# Patient Record
Sex: FEMALE | Race: WHITE | HISPANIC OR LATINO | Employment: OTHER | URBAN - METROPOLITAN AREA
[De-identification: names, ages, dates, MRNs, and addresses within clinical notes are randomized per-mention and may not be internally consistent; named-entity substitution may affect disease eponyms.]

---

## 2017-10-07 ENCOUNTER — HOSPITAL ENCOUNTER (OUTPATIENT)
Dept: LAB | Facility: MEDICAL CENTER | Age: 37
End: 2017-10-07
Attending: PSYCHIATRY & NEUROLOGY
Payer: COMMERCIAL

## 2017-10-07 LAB
B-HCG SERPL-ACNC: <0.6 MIU/ML (ref 0–5)
HCG SERPL QL: NEGATIVE

## 2017-10-07 PROCEDURE — 36415 COLL VENOUS BLD VENIPUNCTURE: CPT

## 2017-10-07 PROCEDURE — 84703 CHORIONIC GONADOTROPIN ASSAY: CPT

## 2017-10-07 PROCEDURE — 84702 CHORIONIC GONADOTROPIN TEST: CPT

## 2018-01-19 ENCOUNTER — OFFICE VISIT (OUTPATIENT)
Dept: URGENT CARE | Facility: CLINIC | Age: 38
End: 2018-01-19
Payer: COMMERCIAL

## 2018-01-19 VITALS
HEIGHT: 68 IN | SYSTOLIC BLOOD PRESSURE: 110 MMHG | WEIGHT: 137.4 LBS | TEMPERATURE: 98.1 F | HEART RATE: 75 BPM | DIASTOLIC BLOOD PRESSURE: 70 MMHG | OXYGEN SATURATION: 97 % | BODY MASS INDEX: 20.82 KG/M2

## 2018-01-19 DIAGNOSIS — K59.00 CONSTIPATION, UNSPECIFIED CONSTIPATION TYPE: ICD-10-CM

## 2018-01-19 PROCEDURE — 99214 OFFICE O/P EST MOD 30 MIN: CPT | Performed by: NURSE PRACTITIONER

## 2018-01-19 RX ORDER — LAMOTRIGINE 25 MG/1
TABLET, CHEWABLE ORAL
COMMUNITY
End: 2022-06-23

## 2018-01-19 RX ORDER — RISPERIDONE 0.25 MG/1
0.25 TABLET ORAL 2 TIMES DAILY
COMMUNITY

## 2018-01-19 RX ORDER — BUPROPION HYDROCHLORIDE 75 MG/1
75 TABLET ORAL 2 TIMES DAILY
COMMUNITY
End: 2022-06-23

## 2018-01-21 ASSESSMENT — ENCOUNTER SYMPTOMS
ABDOMINAL PAIN: 1
BLOOD IN STOOL: 1
NAUSEA: 0
DIARRHEA: 0
CONSTIPATION: 1
FEVER: 0
VOMITING: 0

## 2018-01-21 NOTE — PROGRESS NOTES
"Subjective:      Anny Tian is a 37 y.o. female who presents with Abdominal Pain (\"sharp pain on L side of ovary\") and Constipation (\" for about a week,nothing has helped,blood when trying\")            Abdominal Pain   This is a new problem. Episode onset: Pt reports onset of constipation about 4-7 days ago. She has not had a bowel movement for almost a week. Complains of lower left abd pain, intermittently. Denies fever or vomiting. + decrease in appetite. The onset quality is gradual. The problem occurs constantly. The problem has been unchanged. The pain is located in the LLQ. The pain is mild. The quality of the pain is a sensation of fullness. The abdominal pain does not radiate. Associated symptoms include constipation. Pertinent negatives include no diarrhea, fever, nausea or vomiting. She has tried nothing for the symptoms.   Constipation   Associated symptoms include abdominal pain. Pertinent negatives include no diarrhea, fever, nausea or vomiting.       Review of Systems   Constitutional: Negative for fever.   Gastrointestinal: Positive for abdominal pain, blood in stool (marilin) and constipation. Negative for diarrhea, nausea and vomiting.   All other systems reviewed and are negative.    Past Medical History:   Diagnosis Date   • Allergy    • Arrhythmia     birth   • Hay fever 9/25/2013   • Headache(784.0)    • Substance abuse     alcoholic; last drank about a year ago.       Past Surgical History:   Procedure Laterality Date   • DILATION AND CURETTAGE      3 TABS 1998,1999,2000   • TONSILLECTOMY      Childhood      Social History     Social History   • Marital status: Single     Spouse name: N/A   • Number of children: N/A   • Years of education: N/A     Occupational History   • Not on file.     Social History Main Topics   • Smoking status: Never Smoker   • Smokeless tobacco: Never Used   • Alcohol use No   • Drug use: No   • Sexual activity: Yes     Partners: Male      Comment: none      Other " "Topics Concern   • Not on file     Social History Narrative   • No narrative on file          Objective:     /70   Pulse 75   Temp 36.7 °C (98.1 °F)   Ht 1.727 m (5' 8\")   Wt 62.3 kg (137 lb 6.4 oz)   LMP 01/20/2013   SpO2 97%   BMI 20.89 kg/m²      Physical Exam   Constitutional: She is oriented to person, place, and time. Vital signs are normal. She appears well-developed and well-nourished.   HENT:   Head: Normocephalic and atraumatic.   Eyes: EOM are normal. Pupils are equal, round, and reactive to light.   Neck: Normal range of motion.   Cardiovascular: Normal rate and regular rhythm.    Pulmonary/Chest: Effort normal.   Abdominal: Soft. Normal appearance and bowel sounds are normal. She exhibits no distension. There is tenderness in the left lower quadrant. There is no rigidity, no rebound and no tenderness at McBurney's point.   Musculoskeletal: Normal range of motion.   Neurological: She is alert and oriented to person, place, and time.   Skin: Skin is warm and dry. Capillary refill takes less than 2 seconds.   Psychiatric: She has a normal mood and affect. Her speech is normal and behavior is normal. Thought content normal.   Vitals reviewed.              Assessment/Plan:     1. Constipation, unspecified constipation type    DDX discussed with patient include but are not limited to gastritis, constipation, diverticulitis, ovarian cyst  Her s/s seem consistent with constipation  I would like her to obtain OTC miralax and take one capful BID for one week  Increase water intake  Increase fiber intake  Strict ER precautions for inability to produce BM, vomiting or severe abdominal pain  Supportive care, differential diagnoses, and indications for immediate follow-up discussed with patient.    Pathogenesis of diagnosis discussed including typical length and natural progression.      Instructed to return to  or nearest emergency department if symptoms fail to improve, for any change in condition, " further concerns, or new concerning symptoms.  Patient states understanding of the plan of care and discharge instructions.

## 2018-02-28 ENCOUNTER — HOSPITAL ENCOUNTER (OUTPATIENT)
Dept: RADIOLOGY | Facility: MEDICAL CENTER | Age: 38
End: 2018-02-28
Attending: FAMILY MEDICINE
Payer: COMMERCIAL

## 2018-02-28 DIAGNOSIS — D17.1 LIPOMA OF BACK: ICD-10-CM

## 2018-02-28 PROCEDURE — 76604 US EXAM CHEST: CPT

## 2018-09-20 ENCOUNTER — OFFICE VISIT (OUTPATIENT)
Dept: URGENT CARE | Facility: CLINIC | Age: 38
End: 2018-09-20
Payer: COMMERCIAL

## 2018-09-20 VITALS
SYSTOLIC BLOOD PRESSURE: 110 MMHG | TEMPERATURE: 98 F | OXYGEN SATURATION: 98 % | HEIGHT: 68 IN | WEIGHT: 136 LBS | BODY MASS INDEX: 20.61 KG/M2 | HEART RATE: 78 BPM | RESPIRATION RATE: 16 BRPM | DIASTOLIC BLOOD PRESSURE: 70 MMHG

## 2018-09-20 DIAGNOSIS — L73.9 FOLLICULITIS: ICD-10-CM

## 2018-09-20 PROCEDURE — 99214 OFFICE O/P EST MOD 30 MIN: CPT | Performed by: FAMILY MEDICINE

## 2018-09-20 RX ORDER — SULFAMETHOXAZOLE AND TRIMETHOPRIM 800; 160 MG/1; MG/1
1 TABLET ORAL 2 TIMES DAILY
Qty: 20 TAB | Refills: 0 | Status: SHIPPED | OUTPATIENT
Start: 2018-09-20 | End: 2018-09-30

## 2018-09-23 NOTE — PROGRESS NOTES
"Subjective:   Anny Tian is a 38 y.o. female who presents for Rash (on both ankles, inner buttocks, x 2 months.)        Rash   This is a new problem. The current episode started more than 1 month ago. The problem has been gradually worsening since onset. The affected locations include the left buttock and right buttock. The rash is characterized by redness, swelling and pain.     Review of Systems   Skin: Positive for rash.     Allergies   Allergen Reactions   • Benadryl Allergy    • Vicodin [Hydrocodone-Acetaminophen]       Objective:   /70   Pulse 78   Temp 36.7 °C (98 °F)   Resp 16   Ht 1.727 m (5' 8\")   Wt 61.7 kg (136 lb)   SpO2 98%   BMI 20.68 kg/m²   Physical Exam   Constitutional: She is oriented to person, place, and time. She appears well-developed and well-nourished. No distress.   HENT:   Head: Normocephalic and atraumatic.   Eyes: Pupils are equal, round, and reactive to light. Conjunctivae and EOM are normal.   Cardiovascular: Normal rate and regular rhythm.    No murmur heard.  Pulmonary/Chest: Effort normal and breath sounds normal. No respiratory distress.   Abdominal: Soft. She exhibits no distension. There is no tenderness.   Neurological: She is alert and oriented to person, place, and time. She has normal reflexes. No sensory deficit.   Skin: Skin is warm and dry. Rash noted. Rash is papular.   Psychiatric: She has a normal mood and affect.         Assessment/Plan:   Assessment    1. Folliculitis  - sulfamethoxazole-trimethoprim (BACTRIM DS) 800-160 MG tablet; Take 1 Tab by mouth 2 times a day for 10 days.  Dispense: 20 Tab; Refill: 0    Differential diagnosis, natural history, supportive care, and indications for immediate follow-up discussed.       "

## 2019-08-05 ENCOUNTER — HOSPITAL ENCOUNTER (OUTPATIENT)
Dept: LAB | Facility: MEDICAL CENTER | Age: 39
End: 2019-08-05
Attending: STUDENT IN AN ORGANIZED HEALTH CARE EDUCATION/TRAINING PROGRAM
Payer: COMMERCIAL

## 2019-08-05 LAB
ALBUMIN SERPL BCP-MCNC: 4.7 G/DL (ref 3.2–4.9)
ALBUMIN/GLOB SERPL: 1.9 G/DL
ALP SERPL-CCNC: 47 U/L (ref 30–99)
ALT SERPL-CCNC: 15 U/L (ref 2–50)
ANION GAP SERPL CALC-SCNC: 7 MMOL/L (ref 0–11.9)
AST SERPL-CCNC: 18 U/L (ref 12–45)
BASOPHILS # BLD AUTO: 0.2 % (ref 0–1.8)
BASOPHILS # BLD: 0.02 K/UL (ref 0–0.12)
BILIRUB SERPL-MCNC: 0.6 MG/DL (ref 0.1–1.5)
BUN SERPL-MCNC: 9 MG/DL (ref 8–22)
CALCIUM SERPL-MCNC: 9.7 MG/DL (ref 8.5–10.5)
CHLORIDE SERPL-SCNC: 106 MMOL/L (ref 96–112)
CO2 SERPL-SCNC: 26 MMOL/L (ref 20–33)
CREAT SERPL-MCNC: 0.71 MG/DL (ref 0.5–1.4)
EOSINOPHIL # BLD AUTO: 0.05 K/UL (ref 0–0.51)
EOSINOPHIL NFR BLD: 0.6 % (ref 0–6.9)
ERYTHROCYTE [DISTWIDTH] IN BLOOD BY AUTOMATED COUNT: 43.9 FL (ref 35.9–50)
GLOBULIN SER CALC-MCNC: 2.5 G/DL (ref 1.9–3.5)
GLUCOSE SERPL-MCNC: 70 MG/DL (ref 65–99)
HCT VFR BLD AUTO: 43.7 % (ref 37–47)
HGB BLD-MCNC: 14.1 G/DL (ref 12–16)
HIV 1+2 AB+HIV1 P24 AG SERPL QL IA: NON REACTIVE
IMM GRANULOCYTES # BLD AUTO: 0.01 K/UL (ref 0–0.11)
IMM GRANULOCYTES NFR BLD AUTO: 0.1 % (ref 0–0.9)
LYMPHOCYTES # BLD AUTO: 1.98 K/UL (ref 1–4.8)
LYMPHOCYTES NFR BLD: 24.7 % (ref 22–41)
MCH RBC QN AUTO: 30.1 PG (ref 27–33)
MCHC RBC AUTO-ENTMCNC: 32.3 G/DL (ref 33.6–35)
MCV RBC AUTO: 93.4 FL (ref 81.4–97.8)
MONOCYTES # BLD AUTO: 0.41 K/UL (ref 0–0.85)
MONOCYTES NFR BLD AUTO: 5.1 % (ref 0–13.4)
NEUTROPHILS # BLD AUTO: 5.54 K/UL (ref 2–7.15)
NEUTROPHILS NFR BLD: 69.3 % (ref 44–72)
NRBC # BLD AUTO: 0 K/UL
NRBC BLD-RTO: 0 /100 WBC
PLATELET # BLD AUTO: 263 K/UL (ref 164–446)
PMV BLD AUTO: 10 FL (ref 9–12.9)
POTASSIUM SERPL-SCNC: 3.9 MMOL/L (ref 3.6–5.5)
PROT SERPL-MCNC: 7.2 G/DL (ref 6–8.2)
RBC # BLD AUTO: 4.68 M/UL (ref 4.2–5.4)
SODIUM SERPL-SCNC: 139 MMOL/L (ref 135–145)
TREPONEMA PALLIDUM IGG+IGM AB [PRESENCE] IN SERUM OR PLASMA BY IMMUNOASSAY: NON REACTIVE
WBC # BLD AUTO: 8 K/UL (ref 4.8–10.8)

## 2019-08-05 PROCEDURE — 87389 HIV-1 AG W/HIV-1&-2 AB AG IA: CPT

## 2019-08-05 PROCEDURE — 86780 TREPONEMA PALLIDUM: CPT

## 2019-08-05 PROCEDURE — 80053 COMPREHEN METABOLIC PANEL: CPT

## 2019-08-05 PROCEDURE — 36415 COLL VENOUS BLD VENIPUNCTURE: CPT

## 2019-08-05 PROCEDURE — 85025 COMPLETE CBC W/AUTO DIFF WBC: CPT

## 2019-08-21 ENCOUNTER — HOSPITAL ENCOUNTER (OUTPATIENT)
Dept: LAB | Facility: MEDICAL CENTER | Age: 39
End: 2019-08-21
Attending: OBSTETRICS & GYNECOLOGY
Payer: COMMERCIAL

## 2019-08-21 LAB — B-HCG SERPL-ACNC: <0.6 MIU/ML (ref 0–5)

## 2019-08-21 PROCEDURE — 84702 CHORIONIC GONADOTROPIN TEST: CPT

## 2019-08-21 PROCEDURE — 36415 COLL VENOUS BLD VENIPUNCTURE: CPT

## 2020-06-19 ENCOUNTER — HOSPITAL ENCOUNTER (OUTPATIENT)
Dept: HOSPITAL 8 - LAB | Age: 40
Discharge: HOME | End: 2020-06-19
Attending: FAMILY MEDICINE
Payer: COMMERCIAL

## 2020-06-19 DIAGNOSIS — R53.83: ICD-10-CM

## 2020-06-19 DIAGNOSIS — N95.1: Primary | ICD-10-CM

## 2020-06-19 LAB
ALBUMIN SERPL-MCNC: 4.2 G/DL (ref 3.4–5)
ALP SERPL-CCNC: 65 U/L (ref 45–117)
ALT SERPL-CCNC: 21 U/L (ref 12–78)
ANION GAP SERPL CALC-SCNC: 4 MMOL/L (ref 5–15)
BASOPHILS # BLD AUTO: 0.02 X10^3/UL (ref 0–0.1)
BASOPHILS NFR BLD AUTO: 0 % (ref 0–1)
BILIRUB SERPL-MCNC: 0.4 MG/DL (ref 0.2–1)
CALCIUM SERPL-MCNC: 8.7 MG/DL (ref 8.5–10.1)
CHLORIDE SERPL-SCNC: 110 MMOL/L (ref 98–107)
CREAT SERPL-MCNC: 0.87 MG/DL (ref 0.55–1.02)
EOSINOPHIL # BLD AUTO: 0.09 X10^3/UL (ref 0–0.4)
EOSINOPHIL NFR BLD AUTO: 1 % (ref 1–7)
ERYTHROCYTE [DISTWIDTH] IN BLOOD BY AUTOMATED COUNT: 12.6 % (ref 9.6–15.2)
LYMPHOCYTES # BLD AUTO: 1.89 X10^3/UL (ref 1–3.4)
LYMPHOCYTES NFR BLD AUTO: 27 % (ref 22–44)
MCH RBC QN AUTO: 31.6 PG (ref 27–34.8)
MCHC RBC AUTO-ENTMCNC: 33.4 G/DL (ref 32.4–35.8)
MCV RBC AUTO: 94.8 FL (ref 80–100)
MD: NO
MONOCYTES # BLD AUTO: 0.37 X10^3/UL (ref 0.2–0.8)
MONOCYTES NFR BLD AUTO: 5 % (ref 2–9)
NEUTROPHILS # BLD AUTO: 4.58 X10^3/UL (ref 1.8–6.8)
NEUTROPHILS NFR BLD AUTO: 66 % (ref 42–75)
PLATELET # BLD AUTO: 278 X10^3/UL (ref 130–400)
PMV BLD AUTO: 7.9 FL (ref 7.4–10.4)
PROT SERPL-MCNC: 7.5 G/DL (ref 6.4–8.2)
RBC # BLD AUTO: 4.7 X10^6/UL (ref 3.82–5.3)

## 2020-06-19 PROCEDURE — 84403 ASSAY OF TOTAL TESTOSTERONE: CPT

## 2020-06-19 PROCEDURE — 82627 DEHYDROEPIANDROSTERONE: CPT

## 2020-06-19 PROCEDURE — 80053 COMPREHEN METABOLIC PANEL: CPT

## 2020-06-19 PROCEDURE — 85025 COMPLETE CBC W/AUTO DIFF WBC: CPT

## 2020-06-19 PROCEDURE — 84443 ASSAY THYROID STIM HORMONE: CPT

## 2020-06-19 PROCEDURE — 83002 ASSAY OF GONADOTROPIN (LH): CPT

## 2020-06-19 PROCEDURE — 36415 COLL VENOUS BLD VENIPUNCTURE: CPT

## 2020-06-19 PROCEDURE — 82670 ASSAY OF TOTAL ESTRADIOL: CPT

## 2021-11-23 ENCOUNTER — TELEPHONE (OUTPATIENT)
Dept: OBGYN | Facility: CLINIC | Age: 41
End: 2021-11-23

## 2021-11-23 NOTE — TELEPHONE ENCOUNTER
Patient called requesting the date of her last Pap smear with Dr. Posey. Message was given to her MA.

## 2021-11-29 NOTE — TELEPHONE ENCOUNTER
Patient called earlier wanting to know when her last pap was. I called her back to let her know when her last pap was. Asked if she wanted to schedule an appt but declined.

## 2021-12-21 ENCOUNTER — HOSPITAL ENCOUNTER (OUTPATIENT)
Dept: LAB | Facility: MEDICAL CENTER | Age: 41
End: 2021-12-21
Attending: OBSTETRICS & GYNECOLOGY
Payer: COMMERCIAL

## 2021-12-21 ENCOUNTER — HOSPITAL ENCOUNTER (OUTPATIENT)
Dept: LAB | Facility: MEDICAL CENTER | Age: 41
End: 2021-12-21
Attending: NURSE PRACTITIONER
Payer: COMMERCIAL

## 2021-12-21 ENCOUNTER — HOSPITAL ENCOUNTER (OUTPATIENT)
Facility: MEDICAL CENTER | Age: 41
End: 2021-12-21
Attending: OBSTETRICS & GYNECOLOGY
Payer: COMMERCIAL

## 2021-12-21 ENCOUNTER — GYNECOLOGY VISIT (OUTPATIENT)
Dept: OBGYN | Facility: CLINIC | Age: 41
End: 2021-12-21
Payer: COMMERCIAL

## 2021-12-21 VITALS
SYSTOLIC BLOOD PRESSURE: 105 MMHG | HEIGHT: 68 IN | BODY MASS INDEX: 18.64 KG/M2 | WEIGHT: 123 LBS | DIASTOLIC BLOOD PRESSURE: 60 MMHG

## 2021-12-21 DIAGNOSIS — Z11.3 SCREENING EXAMINATION FOR SEXUALLY TRANSMITTED DISEASE: ICD-10-CM

## 2021-12-21 DIAGNOSIS — Z12.4 SCREENING FOR CERVICAL CANCER: ICD-10-CM

## 2021-12-21 DIAGNOSIS — Z01.419 WELL WOMAN EXAM WITH ROUTINE GYNECOLOGICAL EXAM: ICD-10-CM

## 2021-12-21 PROBLEM — R07.89 ATYPICAL CHEST PAIN: Status: ACTIVE | Noted: 2019-08-08

## 2021-12-21 PROBLEM — K29.70 GASTRITIS DUE TO HELICOBACTER SPECIES: Status: ACTIVE | Noted: 2018-08-20

## 2021-12-21 PROBLEM — F41.8 MIXED ANXIETY DEPRESSIVE DISORDER: Status: ACTIVE | Noted: 2019-08-08

## 2021-12-21 PROBLEM — B36.0 TINEA VERSICOLOR: Status: ACTIVE | Noted: 2017-06-27

## 2021-12-21 PROBLEM — N95.1 HOT FLASHES DUE TO MENOPAUSE: Status: ACTIVE | Noted: 2020-05-12

## 2021-12-21 PROBLEM — B96.81 GASTRITIS DUE TO HELICOBACTER SPECIES: Status: ACTIVE | Noted: 2018-08-20

## 2021-12-21 LAB
HAV IGM SERPL QL IA: NORMAL
HBV CORE IGM SER QL: NORMAL
HBV SURFACE AG SER QL: NORMAL
HCV AB SER QL: NORMAL
HIV 1+2 AB+HIV1 P24 AG SERPL QL IA: NORMAL
TREPONEMA PALLIDUM IGG+IGM AB [PRESENCE] IN SERUM OR PLASMA BY IMMUNOASSAY: NORMAL

## 2021-12-21 PROCEDURE — 84403 ASSAY OF TOTAL TESTOSTERONE: CPT

## 2021-12-21 PROCEDURE — G0101 CA SCREEN;PELVIC/BREAST EXAM: HCPCS | Performed by: OBSTETRICS & GYNECOLOGY

## 2021-12-21 PROCEDURE — 84402 ASSAY OF FREE TESTOSTERONE: CPT

## 2021-12-21 PROCEDURE — 80074 ACUTE HEPATITIS PANEL: CPT

## 2021-12-21 PROCEDURE — 86780 TREPONEMA PALLIDUM: CPT

## 2021-12-21 PROCEDURE — 87389 HIV-1 AG W/HIV-1&-2 AB AG IA: CPT

## 2021-12-21 PROCEDURE — 84270 ASSAY OF SEX HORMONE GLOBUL: CPT

## 2021-12-21 PROCEDURE — 87624 HPV HI-RISK TYP POOLED RSLT: CPT

## 2021-12-21 PROCEDURE — 87591 N.GONORRHOEAE DNA AMP PROB: CPT

## 2021-12-21 PROCEDURE — 36415 COLL VENOUS BLD VENIPUNCTURE: CPT

## 2021-12-21 PROCEDURE — 87491 CHLMYD TRACH DNA AMP PROBE: CPT

## 2021-12-21 PROCEDURE — 88175 CYTOPATH C/V AUTO FLUID REDO: CPT

## 2021-12-21 NOTE — PROGRESS NOTES
Well Woman Exam    CC: well woman exam        HPI: Anny Tian is a 41 y.o.  female who presents for her Annual Gynecologic Exam. She is a pre-existing patient from both my other practices.   Pt has no complaints.  Currently using vasectomy for contraception, sexually active with male partner. She states he had a manic episode last year and was hospitalized and diagnosed with bipolar. She desires STI screening because she is unclear what he did. She does report that she is on testosterone cream and 2 weeks monthly proesesterone cream. She has had multiple vaginal rejuvination treatments with Dr carreon who is also Rx'ed her meds and bioidenticals.       Health Maintenance:  Pap: 3 years, remote h/o abnormal in   Gardisil: not completed  Dexa: not applicable    ROS:  Gen: denies fevers, general concerns  CV/resp: reports no concerns  Breasts: no masses/changes  GI: denies abd pain, GI concerns  : denies irregular vaginal bleeding, discharge, pain, denies urinary complaints  Neuro: denies hx of migraines w/ aura  Endo: denies significant weight changes, irregular menses, temperature intolerance, hotflashes/nightsweats  Psych: denies concerns about mood    OB History    Para Term  AB Living   6 1 1   4 1   SAB IAB Ectopic Molar Multiple Live Births   1 3       1      # Outcome Date GA Lbr Larry/2nd Weight Sex Delivery Anes PTL Lv   6             5 SAB 2011              Birth Comments: IUD   4 Term 06 40w0d  3.657 kg (8 lb 1 oz) F Vag-Spont None  BETTY   3 IAB            2 IAB            1 IAB                  GYN Hx:   Denies hx of STIs  Denies hx of abnl paps see above     Past Medical History:   Diagnosis Date   • Allergy    • Arrhythmia     birth   • Gastroesophageal reflux disease 2014   • Hay fever 2013   • Headache(784.0)    • Mixed anxiety depressive disorder 2019   • Substance abuse ([facility]CC)     alcoholic; last drank about a year ago.         Past Surgical History:   Procedure Laterality Date   • DILATION AND CURETTAGE      3 TABS 1998,1999,2000   • TONSILLECTOMY      Childhood       Medications:   Current Outpatient Medications Ordered in Epic   Medication Sig Dispense Refill   • buPROPion (WELLBUTRIN) 75 MG Tab Take 75 mg by mouth 2 times a day.     • risperidone (RISPERDAL) 0.25 MG Tab Take 0.25 mg by mouth 2 times a day.     • LamoTRIgine (LAMICTAL) 25 MG Chew Tab Take  by mouth.     • guaifenesin-codeine (CHERATUSSIN AC) SOLN Take 5-10 mL by mouth every 6 hours as needed for Cough. 90 mL 0   • oxycodone-acetaminophen (PERCOCET) 5-325 MG TABS Take 1-2 Tabs by mouth every four hours as needed. 15 Tab 0   • ibuprofen (MOTRIN) 800 MG TABS Take 1 Tab by mouth every 8 hours as needed for Mild Pain. 30 Tab 3   • Prenatal MV-Min-Fe Fum-FA-DHA (PRENATAL 1 PO) Take  by mouth.       No current Epic-ordered facility-administered medications on file.       Allergies: Benadryl allergy and Vicodin [hydrocodone-acetaminophen]    Social History     Socioeconomic History   • Marital status: Single     Spouse name: Not on file   • Number of children: Not on file   • Years of education: Not on file   • Highest education level: Not on file   Occupational History   • Not on file   Tobacco Use   • Smoking status: Never Smoker   • Smokeless tobacco: Never Used   Substance and Sexual Activity   • Alcohol use: No   • Drug use: No   • Sexual activity: Yes     Partners: Male     Comment: none    Other Topics Concern   • Not on file   Social History Narrative   • Not on file     Social Determinants of Health     Financial Resource Strain:    • Difficulty of Paying Living Expenses: Not on file   Food Insecurity:    • Worried About Running Out of Food in the Last Year: Not on file   • Ran Out of Food in the Last Year: Not on file   Transportation Needs:    • Lack of Transportation (Medical): Not on file   • Lack of Transportation (Non-Medical): Not on file   Physical  "Activity:    • Days of Exercise per Week: Not on file   • Minutes of Exercise per Session: Not on file   Stress:    • Feeling of Stress : Not on file   Social Connections:    • Frequency of Communication with Friends and Family: Not on file   • Frequency of Social Gatherings with Friends and Family: Not on file   • Attends Alevism Services: Not on file   • Active Member of Clubs or Organizations: Not on file   • Attends Club or Organization Meetings: Not on file   • Marital Status: Not on file   Intimate Partner Violence:    • Fear of Current or Ex-Partner: Not on file   • Emotionally Abused: Not on file   • Physically Abused: Not on file   • Sexually Abused: Not on file   Housing Stability:    • Unable to Pay for Housing in the Last Year: Not on file   • Number of Places Lived in the Last Year: Not on file   • Unstable Housing in the Last Year: Not on file     Exercises regularly- yes    Family History   Problem Relation Age of Onset   • Osteoporosis Mother    • Other Mother    • Alcohol/Drug Brother    • Depression Brother    • Stroke Paternal Grandmother    • Stroke Paternal Grandfather      Denies hx of GI/GYN/breast cancers      Physical Exam   /60 (BP Location: Right arm, Patient Position: Sitting)   Ht 1.727 m (5' 8\")   Wt 55.8 kg (123 lb)   LMP 2021 (Exact Date)   BMI 18.70 kg/m²   gen: AAO, NAD, affect appropriate  Neck: non-tender, no masses, no thyromegaly/nodules appreciated  CV: RRR; no LE edema  resp: ctab  Breast: symmetric, no skin changes, no masses, nontender, no nipple discharge, no lymphadenopathy  abd: soft, NT, ND, no masses, no organomegaly, no hernias  : NEFG, normal urethral meatus, normal anus/perineum, normal vagina and cervix.  Uterus small, anteverted, no adnexal masses/tenderness  Skin: warm/dry, no lesions    Breast and pelvic exam chaperoned by EZEQUIEL Esquivel).  Assessment:   41 y.o.  here for well woman exam  1. Screening examination for sexually transmitted " disease  THINPREP PAP W/HPV AND CTNG    HIV AG/AB COMBO ASSAY SCREENING    HEPATITIS PANEL ACUTE(4 COMPONENTS)    T.PALLIDUM AB EIA    CANCELED: T.PALLIDUM AB EIA   2. Well woman exam with routine gynecological exam     3. Screening for cervical cancer         Plan:   Pap:collected, reviewed pap guidelines  Encouraged exercise and proper diet.  Mammograms starting @ age 40 annually.  GC/CT done with pap and lab slip given for STI serum screening.     Follow up in 1 year for WWE or PRN

## 2021-12-23 LAB
C TRACH DNA GENITAL QL NAA+PROBE: NEGATIVE
CYTOLOGY REG CYTOL: NORMAL
HPV HR 12 DNA CVX QL NAA+PROBE: NEGATIVE
HPV16 DNA SPEC QL NAA+PROBE: NEGATIVE
HPV18 DNA SPEC QL NAA+PROBE: NEGATIVE
N GONORRHOEA DNA GENITAL QL NAA+PROBE: NEGATIVE
SPECIMEN SOURCE: NORMAL
SPECIMEN SOURCE: NORMAL

## 2021-12-27 LAB
SHBG SERPL-SCNC: 121 NMOL/L (ref 30–135)
TESTOST FREE SERPL-MCNC: 1.1 PG/ML (ref 1.1–5.8)
TESTOST SERPL-MCNC: 16 NG/DL (ref 9–55)

## 2022-06-23 ENCOUNTER — OFFICE VISIT (OUTPATIENT)
Dept: INTERNAL MEDICINE | Facility: OTHER | Age: 42
End: 2022-06-23
Payer: COMMERCIAL

## 2022-06-23 VITALS
HEIGHT: 68 IN | DIASTOLIC BLOOD PRESSURE: 68 MMHG | BODY MASS INDEX: 22.07 KG/M2 | TEMPERATURE: 98.1 F | WEIGHT: 145.6 LBS | OXYGEN SATURATION: 94 % | SYSTOLIC BLOOD PRESSURE: 112 MMHG | HEART RATE: 85 BPM

## 2022-06-23 DIAGNOSIS — L82.1 SEBORRHEIC KERATOSIS: ICD-10-CM

## 2022-06-23 DIAGNOSIS — F41.8 MIXED ANXIETY DEPRESSIVE DISORDER: ICD-10-CM

## 2022-06-23 DIAGNOSIS — R68.82 LOW LIBIDO: ICD-10-CM

## 2022-06-23 PROCEDURE — 99214 OFFICE O/P EST MOD 30 MIN: CPT | Mod: GC | Performed by: STUDENT IN AN ORGANIZED HEALTH CARE EDUCATION/TRAINING PROGRAM

## 2022-06-23 RX ORDER — CHLORAL HYDRATE 500 MG
1000 CAPSULE ORAL
COMMUNITY

## 2022-06-23 RX ORDER — CALCIUM CARBONATE 300MG(750)
400 TABLET,CHEWABLE ORAL DAILY
COMMUNITY
Start: 2019-06-01

## 2022-06-23 RX ORDER — MULTIVIT-MIN/IRON/FOLIC ACID/K 18-600-40
4000 CAPSULE ORAL
COMMUNITY

## 2022-06-23 RX ORDER — PHENOL 1.4 %
AEROSOL, SPRAY (ML) MUCOUS MEMBRANE
COMMUNITY

## 2022-06-23 RX ORDER — M-VIT,TX,IRON,MINS/CALC/FOLIC 27MG-0.4MG
1 TABLET ORAL DAILY
COMMUNITY

## 2022-06-23 RX ORDER — BUPROPION HYDROCHLORIDE 150 MG/1
150 TABLET, EXTENDED RELEASE ORAL DAILY
COMMUNITY
Start: 2019-10-11

## 2022-06-23 ASSESSMENT — PATIENT HEALTH QUESTIONNAIRE - PHQ9: CLINICAL INTERPRETATION OF PHQ2 SCORE: 0

## 2022-06-23 NOTE — PATIENT INSTRUCTIONS
Please get labs- Fasting   Please call OBGYN to schedule appt  F/u in 6-8 weeks     Skin Tag care after liquid nitrogen freeze.   May blister within a few hours (clear, red or purple)  Bleeding may occur (though this is not common)  The blister shrinks to be replaced by a scab within a few days  Swelling should settle in a few days  Healing depends on the site the scab peels off within a week after cryotherapy to facial actinic keratoses, after about three weeks to a similar lesion on the hand, and may cause ulceration on the lower leg and take three months or longer to heal  A white gino (hypopigmentation) or scar may result  If there are any signs of infection the patient should contact the practice-For example, increasing redness of surrounding skin, discharge, increasing pain

## 2022-06-23 NOTE — PROGRESS NOTES
"New Patient to Establish    Reason to establish: New patient to establish    CC: \" I have a low libido for the past 2 years\"    HPI: Patient is a 42-year-old female presented to clinic to establish care and complaining about decreased libido ongoing from past 1 year.  Patient has a current medical history of mood disorders for which she has been taking Wellbutrin and risperidone prescribed by behavioral health.  She reported her symptoms are well controlled as she is on the same dose from a several years.  Patient denied sleep disturbance, hot flashes, palpitations.  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  Further patient reported her  has a cognitive impairments and said\" I think he may have a some kind of Alzheimer because he has had difficulty remembering things.\"  When inquired about her marriage life patient says her marriage is\" fine\" although she wishes her  had more time for each other further patient reported her sex life has been frustrating, and states both of the partner not in the mood for intimacy.  Patient denied any active or prevogen cream which she bought from online.  When asked about why she is using it patient states \" I read on the Internet and I think it was good\"   She also questioning aboious reproductive disease.  Last menstrual period was 6/10-6/13, regular and adequate blood flow, periods Come every 24 to 28 days.   Patient using a topical estrut there are some kind of injections that you injected before the intercourse which helped with with the desire of intimacy, states that she read about these injections over the Internet.  Patient denied urinary symptoms, vaginal discharge, abnormal uterine bleeding or any other sexual/ symptoms reported.  Patient following OB/GYN, last follow-up visit was in December 2021.      Patient Active Problem List    Diagnosis Date Noted   • Hot flashes due to menopause 05/12/2020   • Atypical chest pain 08/08/2019 "   • Mixed anxiety depressive disorder 08/08/2019   • Body mass index (BMI) 21.0-21.9, adult 07/19/2019   • Gastritis due to Helicobacter species 08/20/2018   • Tinea versicolor 06/27/2017   • Gastroesophageal reflux disease 12/29/2014   • Other benign neoplasm of skin, unspecified 10/06/2014   • Palpitations 10/06/2014   • Well adult health check 10/06/2014   • Tachycardia 09/25/2013   • Hay fever 09/25/2013   • Hx of drug abuse ([facility]) 09/25/2013   • Dysuria 09/25/2013   • Lower abdominal pain 09/25/2013   • History of alcoholism ([facility]) 04/19/2013       Past Medical History:   Diagnosis Date   • Allergy    • Arrhythmia     birth   • Gastroesophageal reflux disease 12/29/2014   • Hay fever 9/25/2013   • Headache(784.0)    • Mixed anxiety depressive disorder 8/8/2019   • Substance abuse ([facility])     alcoholic; last drank about a year ago.        Current Outpatient Medications   Medication Sig Dispense Refill   • buPROPion SR (WELLBUTRIN-SR) 150 MG TABLET SR 12 HR sustained-release tablet Take 150 Tablets by mouth every day.     • Magnesium 400 MG Tab Take 400 mg by mouth every day.     • choline-magnesium salicylate (TRILISATE) 500 MG/5ML Liquid Take 500 mg by mouth 3 times a day as needed.     • Omega-3 Fatty Acids (FISH OIL) 1000 MG Cap capsule Take 1,000 mg by mouth 3 times a day with meals.     • therapeutic multivitamin-minerals (THERAGRAN-M) Tab Take 1 Tablet by mouth every day.     • Cholecalciferol (VITAMIN D) 50 MCG (2000 UT) Cap Take  by mouth.     • Melatonin 10 MG Tab Take  by mouth.     • buPROPion (WELLBUTRIN) 75 MG Tab Take 75 mg by mouth 2 times a day.     • risperidone (RISPERDAL) 0.25 MG Tab Take 0.25 mg by mouth 2 times a day.     • LamoTRIgine (LAMICTAL) 25 MG Chew Tab Take  by mouth.     • guaifenesin-codeine (CHERATUSSIN AC) SOLN Take 5-10 mL by mouth every 6 hours as needed for Cough. 90 mL 0   • oxycodone-acetaminophen (PERCOCET) 5-325 MG TABS Take 1-2 Tabs by mouth every four hours as needed. 15  Tab 0   • ibuprofen (MOTRIN) 800 MG TABS Take 1 Tab by mouth every 8 hours as needed for Mild Pain. 30 Tab 3   • Prenatal MV-Min-Fe Fum-FA-DHA (PRENATAL 1 PO) Take  by mouth.       No current facility-administered medications for this visit.       Allergies as of 06/23/2022 - Reviewed 06/23/2022   Allergen Reaction Noted   • Benadryl allergy  04/19/2013   • Vicodin [hydrocodone-acetaminophen]  04/19/2013       Social History     Socioeconomic History   • Marital status: Single     Spouse name: Not on file   • Number of children: Not on file   • Years of education: Not on file   • Highest education level: Not on file   Occupational History   • Not on file   Tobacco Use   • Smoking status: Never Smoker   • Smokeless tobacco: Never Used   Vaping Use   • Vaping Use: Never used   Substance and Sexual Activity   • Alcohol use: No   • Drug use: No   • Sexual activity: Yes     Partners: Male     Comment: none    Other Topics Concern   • Not on file   Social History Narrative   • Not on file     Social Determinants of Health     Financial Resource Strain: Not on file   Food Insecurity: Not on file   Transportation Needs: Not on file   Physical Activity: Not on file   Stress: Not on file   Social Connections: Not on file   Intimate Partner Violence: Not on file   Housing Stability: Not on file       Family History   Problem Relation Age of Onset   • Osteoporosis Mother    • Other Mother    • Alcohol/Drug Brother    • Depression Brother    • Stroke Paternal Grandmother    • Stroke Paternal Grandfather        Past Surgical History:   Procedure Laterality Date   • DILATION AND CURETTAGE      3 TABS 1998,1999,2000   • TONSILLECTOMY      Childhood       ROS: As per HPI. Additional pertinent symptoms as noted below.    REVIEW OF SYSTEMS:  CONSTITUTIONAL:  The patient denies any fever or chills.  HEENT:  No headaches, sore throat.  Positive for left otalgia.  CARDIOVASCULAR:  No history of palpitation or arrhythmia.  RESPIRATORY:   "History is negative for cough, productive sputum.  GASTROINTESTINAL:  History is negative for nausea or vomiting.  All other systems essentially negative.      /68 (BP Location: Left arm, Patient Position: Sitting, BP Cuff Size: Large adult)   Pulse 85   Temp 36.7 °C (98.1 °F) (Temporal)   Ht 1.727 m (5' 8\")   Wt 66 kg (145 lb 9.6 oz)   SpO2 94%   BMI 22.14 kg/m²     Physical Exam  General:  Alert and oriented, No apparent distress.    Eyes: Pupils equal and reactive. No scleral icterus.    Throat: Clear no erythema or exudates noted.    Neck: Supple. No lymphadenopathy noted. Thyroid not enlarged.    Lungs: Clear to auscultation and percussion bilaterally.    Cardiovascular: Regular rate and rhythm. No murmurs, rubs or gallops.    Abdomen:  Benign. No rebound or guarding noted.    Extremities: No clubbing, cyanosis, edema.    Skin: Clear. No rash or suspicious skin lesions noted.  Skin tags /actin keratosis noted over the arms and neck.           Assessment and Plan    Patient is s a 42-year-old female presented to clinic to establish care and complaining about decreased libido from past 1 year.   Possible differential diagnosis includes    #Sexual dysfunction in woman  #Hypoactive sexual desire  #Medication side effects  #Marital dysfunction  #Psychiatric illness    We discussed in detail the possible underlying etiology of patient's condition could be from her current medication and psychiatric condition.  Patient already has some of the work-up was done by her OB/GYN and checked sex hormone binding globulin, testosterone and total testosterone which all came back within normal limit also she was tested for STD screenings nonreactive.  At this time I highly advised the patient to schedule an follow-up visit with her OB/GYN to discuss the further work-up if needed.  Also recommend discussing with behavioral therapist sometimes CBT helpful to target current thoughts. Sexual dysfunction is a " multifactorial psychosocial condition and sometimes only medication might not be helpful with this condition, we may need both medication and behavioral therapy to overcome with this condition.  Patient agreed with the plan and will schedule on a follow-up visit with the OB/GYN.    #Actin keratosis   Skin-colored, pink, or erythematous macule with gritty scale (no papule).  Upon patient request treated the 1 lesion with iquid nitrogen  Consent:  Risks and benefits of therapy discussed with patient who voices understanding and agrees with planned care. No barriers to communication or understanding identified.  After obtaining informed consent, the patient's identity, procedure, and site were verified.  Used liquid nitrogen, 5 treatments of 10 seconds each applied.  Frost ring obtained.  Education-aftercare including blister formation, risk of bleeding and risk of recurrence were discussed.  All questions answered.  Return for treatment as needed.  Patient tolerated the procedure without any complication.      Signed by: Robin Gastelum M.D.

## 2022-07-14 ENCOUNTER — HOSPITAL ENCOUNTER (EMERGENCY)
Facility: MEDICAL CENTER | Age: 42
End: 2022-07-15
Attending: EMERGENCY MEDICINE
Payer: COMMERCIAL

## 2022-07-14 VITALS
DIASTOLIC BLOOD PRESSURE: 74 MMHG | RESPIRATION RATE: 16 BRPM | BODY MASS INDEX: 22.19 KG/M2 | TEMPERATURE: 96.2 F | HEART RATE: 78 BPM | WEIGHT: 146.39 LBS | OXYGEN SATURATION: 95 % | SYSTOLIC BLOOD PRESSURE: 112 MMHG | HEIGHT: 68 IN

## 2022-07-14 DIAGNOSIS — K59.00 CONSTIPATION, UNSPECIFIED CONSTIPATION TYPE: ICD-10-CM

## 2022-07-15 PROCEDURE — 99284 EMERGENCY DEPT VISIT MOD MDM: CPT

## 2022-07-15 NOTE — ED NOTES
Ambulated to room with steady gait, reports no BM since Sunday or Monday c/o pressure.   Pt took miralax and mag citrate with no relief

## 2022-07-15 NOTE — ED NOTES
"Pt ambulated past nurses station and yelled \"it worked, thanks!\" to another RN and they notified me. Pt appears to have left ER.  MD notified  "

## 2022-07-15 NOTE — ED NOTES
"Pt refusing blood draw at this time, states \"I just want to get the stool out and go home.\" Pt back in lobby.  "

## 2022-07-15 NOTE — ED NOTES
".  Chief Complaint   Patient presents with   • Constipation     Unable to pass stool. Used magnesium citrate and oxide, and miralax.  Last BM Sunday or Monday.    • Abdominal Pain     Cramps/Bloating/Pressure       41 yo female ambulatory to triage for above complaint. Abdominal pain protocol ordered. Patient reports a previous event of fecal impaction many years ago. Denies obstructions.      Pt is alert and oriented, speaking in full sentences, follows commands and responds appropriately to questions. NAD in triage, although she appears uncomfortable.      Patient placed back for blood draw and educated on triage process. Asked to inform RN of any changes.    /74   Pulse 78   Temp (!) 35.7 °C (96.2 °F) (Temporal)   Resp 16   Ht 1.727 m (5' 8\")   Wt 66.4 kg (146 lb 6.2 oz)   LMP 07/07/2022 (Exact Date)   SpO2 95%   BMI 22.26 kg/m²     "

## 2022-07-15 NOTE — ED PROVIDER NOTES
ED Provider Note    CHIEF COMPLAINT  Chief Complaint   Patient presents with   • Constipation     Unable to pass stool. Used magnesium citrate and oxide, and miralax.  Last BM Sunday or Monday.    • Abdominal Pain     Cramps/Bloating/Pressure        HPI    Primary care provider: Robin Gastelum M.D.   History obtained from: Patient  History limited by: None     Anny Tian is a 42 y.o. female who presents to the ED with  complaining of constipation.  Patient reports she has not had a bowel movement for 5 to 6 days.  She reports history of irregular bowel movements.  She states that sometimes she with go for a few days without bowel movements and other times she would have 2 bowel movements a day.  She takes mag citrate and MiraLAX daily to prevent constipation.  She is reporting a lot of lower abdominal and rectal pressure.  She denies fever/chest pain/shortness of breath or difficulty breathing/nausea/vomiting/dysuria/rash.  She denies prior abdominal surgeries.  She denies possibility of pregnancy.    REVIEW OF SYSTEMS  Please see HPI for pertinent positives/negatives.  All other systems reviewed and are negative.     PAST MEDICAL HISTORY  Past Medical History:   Diagnosis Date   • Mixed anxiety depressive disorder 8/8/2019   • Gastroesophageal reflux disease 12/29/2014   • Hay fever 9/25/2013   • Allergy    • Arrhythmia     birth   • Headache(784.0)    • Substance abuse (HCC)     alcoholic; last drank about a year ago.         SURGICAL HISTORY  Past Surgical History:   Procedure Laterality Date   • DILATION AND CURETTAGE      3 TABS 1998,1999,2000   • TONSILLECTOMY      Childhood        SOCIAL HISTORY  Social History     Tobacco Use   • Smoking status: Never Smoker   • Smokeless tobacco: Never Used   Vaping Use   • Vaping Use: Never used   Substance and Sexual Activity   • Alcohol use: Yes     Alcohol/week: 4.8 oz     Types: 8 Glasses of wine per week     Comment: 8-14 drinks a week   • Drug use: No  "  • Sexual activity: Yes     Partners: Male     Comment: none         FAMILY HISTORY  Family History   Problem Relation Age of Onset   • Osteoporosis Mother    • Other Mother    • Alcohol/Drug Brother    • Depression Brother    • Stroke Paternal Grandmother    • Stroke Paternal Grandfather         CURRENT MEDICATIONS  Home Medications     Reviewed by Shannan Nguyen R.N. (Registered Nurse) on 07/14/22 at 2152  Med List Status: Not Addressed   Medication Last Dose Status   buPROPion SR (WELLBUTRIN-SR) 150 MG TABLET SR 12 HR sustained-release tablet  Active   Cholecalciferol (VITAMIN D) 50 MCG (2000 UT) Cap  Active   choline-magnesium salicylate (TRILISATE) 500 MG/5ML Liquid  Active   Magnesium 400 MG Tab  Active   Melatonin 10 MG Tab  Active   Omega-3 Fatty Acids (FISH OIL) 1000 MG Cap capsule  Active   risperidone (RISPERDAL) 0.25 MG Tab  Active   therapeutic multivitamin-minerals (THERAGRAN-M) Tab  Active                 ALLERGIES  Allergies   Allergen Reactions   • Benadryl Allergy    • Vicodin [Hydrocodone-Acetaminophen]         PHYSICAL EXAM  VITAL SIGNS: /74   Pulse 78   Temp (!) 35.7 °C (96.2 °F) (Temporal)   Resp 16   Ht 1.727 m (5' 8\")   Wt 66.4 kg (146 lb 6.2 oz)   LMP 07/07/2022 (Exact Date)   SpO2 95%   BMI 22.26 kg/m²  @ML[[id number]::@     Pulse ox interpretation: 95% I interpret this pulse ox as normal     Constitutional: Well developed, well nourished, alert in no apparent distress, nontoxic appearance    HENT: No external signs of trauma, normocephalic, mask on due to COVID-19 pandemic  Eyes: PERRL, conjunctiva without erythema, no discharge, no icterus    Neck: Soft and supple, trachea midline, no stridor, no tenderness, no LAD, no JVD, good ROM    Cardiovascular: Regular rate and rhythm, no murmurs/rubs/gallops, strong distal pulses and good perfusion    Thorax & Lungs: No respiratory distress, CTAB   Abdomen: Soft, mild tenderness across lower abdomen, nondistended, no guarding, no " "rebound, normal BS    Rectal: Chaperone present for exam. Normal external exam without hemorrhoids. Good rectal tone.  Stool felt at tip of finger.  Unable to remove digitally.  Back: No CVAT    Extremities: No cyanosis, no edema, no gross deformity, good ROM, no tenderness, intact distal pulses with brisk cap refill    Skin: Warm, dry, no pallor/cyanosis, no rash noted    Lymphatic: No lymphadenopathy noted    Neuro: A/O times 3, no focal deficits noted, ambulating without difficulty  Psychiatric: Cooperative, slightly anxious      DIAGNOSTIC STUDIES / PROCEDURES        LABS  All labs reviewed by me.     Results for orders placed or performed during the hospital encounter of 12/21/21   TESTOSTERONE F&T FEMALES/CHILD   Result Value Ref Range    Testosterone,Total 16 9 - 55 ng/dL    Sex Hormone Bind Globulin 121 30 - 135 nmol/L    Testosterone Fr LCMS 1.1 1.1 - 5.8 pg/mL        RADIOLOGY  The radiologist's interpretation of all radiological studies have been reviewed by me.     No orders to display          COURSE & MEDICAL DECISION MAKING  Nursing notes, VS, PMSFHx reviewed in chart.     Review of past medical records shows the patient was last seen in the office on June 23, 2022 for anxiety and depression and low libido.      Differential diagnoses considered include but are not limited to: Appendicitis, bowel perforation/obstruction, ileus, diverticulitis, KS/renal colic, colitis, constipation, muscle strain, hernia, pregnancy/ectopic, PID, endometriosis, ovarian cyst/torsion      History and physical exam as above.  Patient requesting treatment for constipation and did not want any testing performed.  She was initially treated with fleets enema without improvement.  I attempted digital disimpaction but unsuccessful.  Soapsuds enema was then given.  I was later informed by ED nurse that patient left the ED after reporting that \"it worked.\"      The patient is referred to a primary physician for blood pressure " management, diabetic screening, and for all other preventative health concerns.       FINAL IMPRESSION  1. Constipation, unspecified constipation type Acute          DISPOSITION  Patient eloped from the ED      FOLLOW UP  No follow-up provider specified.       OUTPATIENT MEDICATIONS  Discharge Medication List as of 7/15/2022  1:54 AM             Electronically signed by: Sixto Blankenship D.O., 7/14/2022 11:22 PM      Portions of this record were made with voice recognition software.  Despite my review, spelling/grammar/context errors may still remain.  Interpretation of this chart should be taken in this context.

## 2022-08-22 ENCOUNTER — APPOINTMENT (OUTPATIENT)
Dept: INTERNAL MEDICINE | Facility: OTHER | Age: 42
End: 2022-08-22
Payer: COMMERCIAL

## 2022-10-12 ENCOUNTER — RESEARCH ENCOUNTER (OUTPATIENT)
Dept: RESEARCH | Facility: WORKSITE | Age: 42
End: 2022-10-12
Payer: COMMERCIAL

## 2022-10-12 NOTE — RESEARCH NOTE
Enrollment audit revealed that while pt enrolled on 7/27/2021, a glitch in Epic caused the signed consent form to not be saved. Contacted pt by phone (voicemail) and [employer] informing them and requesting they sign our current consent form to rectify the matter.

## 2022-10-19 ENCOUNTER — RESEARCH ENCOUNTER (OUTPATIENT)
Dept: RESEARCH | Facility: WORKSITE | Age: 42
End: 2022-10-19
Payer: COMMERCIAL

## 2022-10-20 DIAGNOSIS — Z00.6 RESEARCH STUDY PATIENT: ICD-10-CM

## 2022-11-04 ENCOUNTER — HOSPITAL ENCOUNTER (OUTPATIENT)
Facility: MEDICAL CENTER | Age: 42
End: 2022-11-04
Attending: PATHOLOGY
Payer: COMMERCIAL

## 2022-11-04 DIAGNOSIS — Z00.6 RESEARCH STUDY PATIENT: ICD-10-CM

## 2022-11-09 LAB
ELF SCORE: 8.53
RELATIVE RISK: NORMAL
RISK GROUP: NORMAL
RISK: 3.3 %

## 2023-03-30 LAB — HBA1C MFR BLD: 5 % (ref ?–5.8)

## 2023-07-11 ENCOUNTER — OFFICE VISIT (OUTPATIENT)
Dept: INTERNAL MEDICINE | Facility: OTHER | Age: 43
End: 2023-07-11
Payer: COMMERCIAL

## 2023-07-11 VITALS
HEART RATE: 88 BPM | SYSTOLIC BLOOD PRESSURE: 108 MMHG | OXYGEN SATURATION: 96 % | DIASTOLIC BLOOD PRESSURE: 80 MMHG | HEIGHT: 68 IN | WEIGHT: 163.2 LBS | TEMPERATURE: 97.8 F | BODY MASS INDEX: 24.74 KG/M2

## 2023-07-11 DIAGNOSIS — Z78.9 ALCOHOL USE: ICD-10-CM

## 2023-07-11 DIAGNOSIS — R22.1 LUMP IN NECK: ICD-10-CM

## 2023-07-11 PROCEDURE — 3079F DIAST BP 80-89 MM HG: CPT

## 2023-07-11 PROCEDURE — 99214 OFFICE O/P EST MOD 30 MIN: CPT | Mod: GC

## 2023-07-11 PROCEDURE — 3074F SYST BP LT 130 MM HG: CPT

## 2023-07-11 RX ORDER — NALTREXONE HYDROCHLORIDE 50 MG/1
25 TABLET, FILM COATED ORAL DAILY
Qty: 25 TABLET | Refills: 0 | Status: SHIPPED | OUTPATIENT
Start: 2023-07-11

## 2023-07-11 RX ORDER — RISPERIDONE 0.25 MG/1
TABLET, ORALLY DISINTEGRATING ORAL DAILY
COMMUNITY

## 2023-07-11 RX ORDER — GINSENG 100 MG
50 CAPSULE ORAL
COMMUNITY
Start: 2023-05-28

## 2023-07-11 RX ORDER — LANOLIN ALCOHOL/MO/W.PET/CERES
100 CREAM (GRAM) TOPICAL DAILY
Qty: 30 TABLET | Refills: 3 | Status: SHIPPED | OUTPATIENT
Start: 2023-07-11 | End: 2023-12-11

## 2023-07-11 RX ORDER — THEANINE 100 MG
CAPSULE ORAL
COMMUNITY

## 2023-07-11 RX ORDER — LANOLIN ALCOHOL/MO/W.PET/CERES
50 CREAM (GRAM) TOPICAL DAILY
COMMUNITY

## 2023-07-11 ASSESSMENT — ENCOUNTER SYMPTOMS
COUGH: 0
PHOTOPHOBIA: 0
SORE THROAT: 0
DIARRHEA: 0
CLAUDICATION: 0
LOSS OF CONSCIOUSNESS: 0
SEIZURES: 0
VOMITING: 0
TREMORS: 0
HEADACHES: 1
EYE PAIN: 0
HEMOPTYSIS: 0
DIZZINESS: 0
WEIGHT LOSS: 0
DIAPHORESIS: 0
MYALGIAS: 0
ORTHOPNEA: 0
ABDOMINAL PAIN: 0
CONSTIPATION: 0
NAUSEA: 0
EYE DISCHARGE: 0
NECK PAIN: 0
DOUBLE VISION: 0
BACK PAIN: 0
NERVOUS/ANXIOUS: 1
PND: 1
FOCAL WEAKNESS: 0
CONSTITUTIONAL NEGATIVE: 1
SHORTNESS OF BREATH: 0
SENSORY CHANGE: 0
PALPITATIONS: 0
SPEECH CHANGE: 0
HEARTBURN: 0
TINGLING: 0
BLURRED VISION: 1
FEVER: 0
CHILLS: 0

## 2023-07-11 ASSESSMENT — LIFESTYLE VARIABLES: SUBSTANCE_ABUSE: 0

## 2023-07-11 ASSESSMENT — PATIENT HEALTH QUESTIONNAIRE - PHQ9: CLINICAL INTERPRETATION OF PHQ2 SCORE: 0

## 2023-07-11 NOTE — PROGRESS NOTES
"    New Patient  CC: Establish Care with Primary Care Physician     Chief Complaint   Patient presents with    Bump     Noticed bump (neck hump type) on back of neck last week. Right below where her lipoma was cut out       HPI:    Anny Tian is a 43 y.o. female who presents today with concern for \"lump\" on the patient's cervical spine.  Patient notes that she has had the lump for at least 1 week, but is unsure if chronicity.  First noticed by her partner who took pictures at the time.  Per patient, appearance unchanged.  Patient denies any pain with rotation, tenderness, dizziness, fevers, chills, nausea, drenching night sweats, or other lumps on her body.     Alcohol use history also reviewed with patient.  Patient states that she is currently drinking 2-4 beers or glasses of wine per evening.  Patient states that she mostly feels cravings for the taste of good wine or beer, and feels that it would be easy for her to cut back to approximately 1 drink per evening.  Patient states that she is careful not to drink in order to feel a buzz in order to avoid binge drinking.    At today's visit, the patient also shared that she has had multiple chronic issues, including 1 to 2 years of heavy menses, intermittent headaches, episodic sharp chest pain occurring approximately once every 3 months and is only instantaneous, worsening nearsightedness, strong family history of osteoporosis, involving \"dark spots\" that are followed by dermatologist.    ROS:     Review of Systems   Constitutional: Negative.  Negative for chills, diaphoresis, fever, malaise/fatigue and weight loss.   HENT:  Negative for hearing loss, sore throat and tinnitus.    Eyes:  Positive for blurred vision. Negative for double vision, photophobia, pain and discharge.   Respiratory:  Negative for cough, hemoptysis and shortness of breath.    Cardiovascular:  Positive for chest pain and PND. Negative for palpitations, orthopnea, claudication and leg " swelling.   Gastrointestinal:  Negative for abdominal pain, constipation, diarrhea, heartburn, nausea and vomiting.   Genitourinary:  Positive for dysuria. Negative for frequency and urgency.   Musculoskeletal:  Negative for back pain, myalgias and neck pain.   Skin: Negative.  Negative for itching and rash.   Neurological:  Positive for headaches. Negative for dizziness, tingling, tremors, sensory change, speech change, focal weakness, seizures and loss of consciousness.   Psychiatric/Behavioral:  Negative for substance abuse. The patient is nervous/anxious.        Past Medical History:   Diagnosis Date    Allergy     Arrhythmia     birth    Gastroesophageal reflux disease 12/29/2014    Hay fever 9/25/2013    Headache(784.0)     Mixed anxiety depressive disorder 8/8/2019    Substance abuse ([facility])     alcoholic; last drank about a year ago.        Patient Active Problem List    Diagnosis Date Noted    Hot flashes due to menopause 05/12/2020    Atypical chest pain 08/08/2019    Mixed anxiety depressive disorder 08/08/2019    Body mass index (BMI) of 21.0 to 21.9 in adult 07/19/2019    Gastritis due to Helicobacter species 08/20/2018    Tinea versicolor 06/27/2017    Gastroesophageal reflux disease 12/29/2014    Other benign neoplasm of skin, unspecified 10/06/2014    Palpitations 10/06/2014    Well adult health check 10/06/2014    Tachycardia 09/25/2013    Hay fever 09/25/2013    Hx of drug abuse ([facility]) 09/25/2013    Dysuria 09/25/2013    Lower abdominal pain 09/25/2013    History of alcoholism ([facility]) 04/19/2013       Past Surgical History:   Procedure Laterality Date    DILATION AND CURETTAGE      3 TABS 1998,1999,2000    TONSILLECTOMY      Childhood       Family History   Problem Relation Age of Onset    Osteoporosis Mother     Other Mother     Alcohol/Drug Brother     Depression Brother     Stroke Paternal Grandmother     Stroke Paternal Grandfather        Social History     Tobacco Use    Smoking status: Never     "Smokeless tobacco: Never   Vaping Use    Vaping Use: Never used   Substance Use Topics    Alcohol use: Yes     Alcohol/week: 4.8 oz     Types: 8 Glasses of wine per week     Comment: 8-14 drinks a week    Drug use: No       Current Outpatient Medications   Medication Sig Dispense Refill    Risperidone 0.25 MG TABLET DISPERSIBLE Take  by mouth every day.      L-Theanine 100 MG Cap Take  by mouth.      pyridoxine (VITAMIN B-6) 50 MG Tab Take 50 mg by mouth every day.      Calcium Citrate 150 MG Cap Take  by mouth.      ASHWAGANDHA PO Take  by mouth.      magnesium citrate Solution Take 300 mL by mouth one time.      naltrexone (DEPADE) 50 MG Tab Take 0.5 Tablets by mouth every day. 25 Tablet 0    buPROPion SR (WELLBUTRIN-SR) 150 MG TABLET SR 12 HR sustained-release tablet Take 150 Tablets by mouth every day.      Magnesium 400 MG Tab Take 400 mg by mouth every day.      Omega-3 Fatty Acids (FISH OIL) 1000 MG Cap capsule Take 1,000 mg by mouth 3 times a day with meals.      therapeutic multivitamin-minerals (THERAGRAN-M) Tab Take 1 Tablet by mouth every day.      Cholecalciferol (VITAMIN D) 50 MCG (2000 UT) Cap Take 4,000 Units by mouth.      Melatonin 10 MG Tab Take  by mouth.      risperidone (RISPERDAL) 0.25 MG Tab Take 0.25 mg by mouth 2 times a day.       No current facility-administered medications for this visit.       Allergies:    Benadryl allergy and Vicodin [hydrocodone-acetaminophen]    Physical Exam:    /80 (BP Location: Left arm, Patient Position: Sitting, BP Cuff Size: Adult)   Pulse 88   Temp 36.6 °C (97.8 °F) (Temporal)   Ht 1.727 m (5' 8\")   Wt 74 kg (163 lb 3.2 oz)   SpO2 96%   BMI 24.81 kg/m²     General: Well-developed, well-nourished female in no distress  HEENT: NC/AT  Eyes: Conjuntiva without any obvious injection or erythema.   Mouth: mucous membranes moist, no erythema  Neck: Supple, non-tender with no thyromegaly or adenopathy.  Poorly defined, rubbery thickening of tissue over " caudal cervical spine  Extremites: No cyanosis/clubbing/edema. No obvious deformities.  Skin: Warm and dry.  No visible rashes.  Neuro: Alert & oriented, grossly non-focal  Psych: Patient is awake, alert and oriented with recent and remote memory intact. Affect normal, mood normal, judgment normal.    Assessment and Plan:     1. Lump in neck  Although texture/character consistent with lipoma, mass is poorly defined and lying superior to cervical spine. Unclear if this mass is involving nuchal/supraspinous ligament. Will need imaging to get adequate characterization, followed by referral to appropriate provider depending on the depth of involvement.  - US-SOFT TISSUES OF HEAD - NECK; Future  - depending on depth of involvement, referral to dermatology vs orthopedics/neurosurgery     2. Alcohol use  Patient drinking more than recommended amount. Patient reports drinking 2-4 drinks per night with some consistency for ~5 years. 5 year period of sobriety prior. Patient counseled on reduction on intake. Patient uninterested in IOP at this time. She will reduce intake under close supervision in the setting of current prescription of Wellbutrin lowering seizure threshold.  - Initiate Depede 25 mg daily, can consider increasing dose at next visit if well tolerated.  - Acute follow up in 1-2 weeks to monitor for symptoms of withdrawal and to ensure appropriate progress to goal of 1 standard drink per day or less.    All imaging results and lab results and consult notes are reviewed at this visit.    Signed by: Nathen Aaron M.D.

## 2023-07-11 NOTE — PROGRESS NOTES
Teaching Physician Attestation      Level of Participation    I have personally interviewed and examined the patient.  In addition, I discussed with the resident physician the patient's history, exam, assessment and plan in detail.  Topics listed in my addendum were the focus of the visit.  Healthcare maintenance was not addressed this visit unless listed as a topic in my addendum.  I agree with the plan as written along with the following additions/modifications:    Patient here to reestablish care with resident panel.    Many possible topics to discuss, triaged as below:    Past medical history decreased libido, actinic keratosis, mood disorder, H. pylori gastritis, alcohol use disorder per chart review.    Neck mass possibly consistent with lipoma, but needs further evaluation  -Patient's partner noticed a lump on the patient's back, patient denies B symptoms, reports previous lipoma in a similar area that was removed surgically.  On exam there is approximately a 3 cm rubbery mass over the lower cervical/upper thoracic spine, cannot completely palpate the edges, nontender, nonerythematous.  Recent CBC was normal.  No cervical or clavicular lymphadenopathy.    Plan  -Ultrasound to better characterize, consider referral to surgery for subsequent removal if needed.      Alcohol use disorder, recurrent  -3-4 drinks daily, previously reports binge drinking, is interested in reducing usage, patient is very pleasant.  Vital signs stable, is not actively withdrawing, states that she is able to not drink on certain days and does not withdraw, does not feel she has a craving to drink although states that she does like the taste of alcohol.  Began discussion of on possible dangers of continued drinking and dangers of withdrawal.  Recommend thiamine/multivitamin, also discussed options of naltrexone, Alcoholics Anonymous, intensive outpatient monitoring.  Plan per Dr. Aaron's note.  B12 and lfts nl 3/30/23    Addendum:  Patient reports wanting to do gradual cessation on her own without monitoring, recommended slow taper of current drinking to prevent withdrawal (no more than 1 drink less per day increments), addition of thiamine/multivitamin.  Patient will also begin naltrexone.  Cautioned to seek care if withdrawal sx. close follow-up in 1 week.    Chest pain  -sharp, brief, non-exertional, no sob, occurring once a month..  Unlikely cardiac, defer to future visit.  Well-appearing today.    Chronic Ha  -chronic (years), occasional, non-intractible.  Needs /fu future visit      Menstrual changes  -2 years chronic, nl hg 3/2023, needs f/u future vist      Loss of taste  -lost years ago completely for 1 month, then modified taste from baseline return.  Needs subsequent follow-up.    Supplement use -need to  on supplement use.    Return to clinic in 1 to 2 weeks for close follow-up.

## 2023-08-22 ENCOUNTER — OFFICE VISIT (OUTPATIENT)
Dept: INTERNAL MEDICINE | Facility: OTHER | Age: 43
End: 2023-08-22
Payer: COMMERCIAL

## 2023-08-22 VITALS
OXYGEN SATURATION: 96 % | TEMPERATURE: 98.4 F | SYSTOLIC BLOOD PRESSURE: 115 MMHG | BODY MASS INDEX: 23.4 KG/M2 | WEIGHT: 158 LBS | DIASTOLIC BLOOD PRESSURE: 79 MMHG | HEART RATE: 82 BPM | HEIGHT: 69 IN

## 2023-08-22 DIAGNOSIS — Z78.9 ALCOHOL USE: ICD-10-CM

## 2023-08-22 DIAGNOSIS — R22.1 LUMP ON NECK: ICD-10-CM

## 2023-08-22 DIAGNOSIS — Z00.00 WELL ADULT HEALTH CHECK: ICD-10-CM

## 2023-08-22 PROCEDURE — 99214 OFFICE O/P EST MOD 30 MIN: CPT | Mod: GC

## 2023-08-22 PROCEDURE — 3078F DIAST BP <80 MM HG: CPT

## 2023-08-22 PROCEDURE — 3074F SYST BP LT 130 MM HG: CPT

## 2023-08-22 RX ORDER — BUPROPION HYDROCHLORIDE 100 MG/1
100 TABLET, EXTENDED RELEASE ORAL EVERY MORNING
COMMUNITY
Start: 2023-08-09

## 2023-10-06 NOTE — PROGRESS NOTES
Established Patient    Patient Care Team:  Nathen Aaron M.D. as PCP - General (Internal Medicine)    CC:   Chief Complaint   Patient presents with    Results       HPI:  Anny Tian is a 43 y.o. female with past medical history significant for GERD, alcohol use, mixed anxiety/depressive disorder, lipoma s/p resection who presents today with the following Chief Complaint(s): Follow up for lump on neck and preventative care.    Ms. Tian continues to express anxiety concerning the lump over her cervical neck that was first highlighted by her  prior to our most recent appointment.  Clinic, the patient is able to follow-up with ultrasound no visible benign mass. Patient at this visit appears to be highlighting the prominence of her cervical spine that is immediately superior medial to previous surgical scar from lipoma excision.    Ms. Tian presents today curious about additional laboratory screening continue routine healthcare maintenance.    Patient further states she has quit alcohol cold turkey against advice received at previous visit to wean off alcohol.  Patient states she had 1 or 2 days where she noticed she was shaking but does not report any heightened anxiety, seizures, or lapses of consciousness.  Patient had previously had 1 to 2 glasses of wine per night reported.  Patient has not had any recent lab work concerning liver or kidney function.    ROS:       Review of systems negative except for as otherwise specified in the HPI.      Past Medical History:   Diagnosis Date    Allergy     Arrhythmia     birth    Gastroesophageal reflux disease 12/29/2014    Hay fever 9/25/2013    Headache(784.0)     Mixed anxiety depressive disorder 8/8/2019    Substance abuse (HCC)     alcoholic; last drank about a year ago.      Patient Active Problem List    Diagnosis Date Noted    Hot flashes due to menopause 05/12/2020    Atypical chest pain 08/08/2019    Mixed anxiety depressive disorder 08/08/2019     Body mass index (BMI) of 21.0 to 21.9 in adult 07/19/2019    Gastritis due to Helicobacter species 08/20/2018    Tinea versicolor 06/27/2017    Gastroesophageal reflux disease 12/29/2014    Other benign neoplasm of skin, unspecified 10/06/2014    Palpitations 10/06/2014    Well adult health check 10/06/2014    Tachycardia 09/25/2013    Hay fever 09/25/2013    Hx of drug abuse ([facility]) 09/25/2013    Dysuria 09/25/2013    Lower abdominal pain 09/25/2013    History of alcoholism ([facility]) 04/19/2013     Social History     Tobacco Use    Smoking status: Never    Smokeless tobacco: Never   Vaping Use    Vaping Use: Never used   Substance Use Topics    Alcohol use: Yes     Alcohol/week: 4.8 oz     Types: 8 Glasses of wine per week     Comment: 8-14 drinks a week    Drug use: No     Current Outpatient Medications   Medication Sig Dispense Refill    buPROPion SR (WELLBUTRIN-SR) 100 MG TABLET SR 12 HR Take 100 mg by mouth every morning.      Risperidone 0.25 MG TABLET DISPERSIBLE Take  by mouth every day.      L-Theanine 100 MG Cap Take  by mouth.      pyridoxine (VITAMIN B-6) 50 MG Tab Take 50 mg by mouth every day.      Calcium Citrate 150 MG Cap Take  by mouth.      ASHWAGANDHA PO Take  by mouth.      magnesium citrate Solution Take 300 mL by mouth one time.      Zinc 50 MG Tab Take 50 mg by mouth every 48 hours.      thiamine (THIAMINE) 100 MG tablet Take 1 Tablet by mouth every day. 30 Tablet 3    Magnesium 400 MG Tab Take 400 mg by mouth every day.      Omega-3 Fatty Acids (FISH OIL) 1000 MG Cap capsule Take 1,000 mg by mouth 3 times a day with meals.      therapeutic multivitamin-minerals (THERAGRAN-M) Tab Take 1 Tablet by mouth every day.      Cholecalciferol (VITAMIN D) 50 MCG (2000 UT) Cap Take 4,000 Units by mouth.      Melatonin 10 MG Tab Take  by mouth.      risperidone (RISPERDAL) 0.25 MG Tab Take 0.25 mg by mouth 2 times a day.      naltrexone (DEPADE) 50 MG Tab Take 0.5 Tablets by mouth every day. (Patient not  "taking: Reported on 8/22/2023) 25 Tablet 0    buPROPion SR (WELLBUTRIN-SR) 150 MG TABLET SR 12 HR sustained-release tablet Take 150 Tablets by mouth every day. (Patient not taking: Reported on 8/22/2023)       No current facility-administered medications for this visit.     Past Surgical History:   Procedure Laterality Date    DILATION AND CURETTAGE      3 TABS 1998,1999,2000    TONSILLECTOMY      Childhood     Family History   Problem Relation Age of Onset    Osteoporosis Mother     Other Mother     Alcohol/Drug Brother     Depression Brother     Stroke Paternal Grandmother     Stroke Paternal Grandfather      Social History     Tobacco Use    Smoking status: Never    Smokeless tobacco: Never   Vaping Use    Vaping Use: Never used   Substance Use Topics    Alcohol use: Yes     Alcohol/week: 4.8 oz     Types: 8 Glasses of wine per week     Comment: 8-14 drinks a week    Drug use: No       Allergies:  Hydrocodone-acetaminophen, Benadryl allergy, and Vicodin [hydrocodone-acetaminophen]    Physical Exam:  /79 (BP Location: Right arm, Patient Position: Sitting, BP Cuff Size: Small adult)   Pulse 82   Temp 36.9 °C (98.4 °F) (Temporal)   Ht 1.753 m (5' 9\")   Wt 71.7 kg (158 lb)   SpO2 96%   BMI 23.33 kg/m²     General: Well-developed, well-nourished female in no distress  HEENT: NC/AT  Eyes: Conjuntiva without any obvious injection or erythema.   Mouth: mucous membranes moist, no erythema  Neck: Supple, non-tender with no thyromegaly or adenopathy. Cervical pedicles noted to be more prominent In area of previous lipoma excision..   Lungs: Clear to auscultation bilaterally with good respiratory effort.  No wheezes, crackles or rhonci are heard.  Heart: Normal rate, regular rhythm with no murmurs, rubs or gallops heard.  Abdomen: Soft, non-tender, non-distended. No guarding or rigidity. No organomegaly noted.  Extremites: No cyanosis/clubbing/edema. No obvious deformities.  Skin: Warm and dry.  No visible " cristóbal.  Neuro: Alert & oriented, grossly non-focal, CN II-XII grossly intact  Psych: Patient is awake, alert and oriented with recent and remote memory intact. Affect anxious, mood normal, judgment normal.    Assessment and Plan:   1. Alcohol use  Patient advised to maintain abstinence from alcohol.  Patient is not interested in intervention such as behavioral therapy or attending Alcoholics Anonymous meetings at this time.  - Comp Metabolic Panel; Future    2. Well adult health check  Patient due for routine screening.  - Comp Metabolic Panel; Future  - HEMOGLOBIN A1C; Future  - Lipid Profile; Future    3. Lump on Neck  Thought to be enhanced prominence due to previous excision causing tightness of skin over the cervical pedicles.  Patient provided with assurance at this time.  No need for further imaging as ultrasound has already demonstrated no mass in the area where the patient is concerned beyond normal baron prominences.    All imaging results and lab results and consult notes are reviewed at this visit.    Signed by: Nathen Aaron M.D.  PGY II

## 2023-12-11 RX ORDER — LANOLIN ALCOHOL/MO/W.PET/CERES
100 CREAM (GRAM) TOPICAL DAILY
Qty: 30 TABLET | Refills: 3 | Status: SHIPPED | OUTPATIENT
Start: 2023-12-11

## 2024-02-07 ENCOUNTER — APPOINTMENT (OUTPATIENT)
Dept: INTERNAL MEDICINE | Facility: OTHER | Age: 44
End: 2024-02-07
Payer: COMMERCIAL

## 2024-04-15 RX ORDER — LANOLIN ALCOHOL/MO/W.PET/CERES
100 CREAM (GRAM) TOPICAL DAILY
Qty: 30 TABLET | Refills: 3 | Status: SHIPPED | OUTPATIENT
Start: 2024-04-15

## 2024-04-15 NOTE — TELEPHONE ENCOUNTER
Received request via: Pharmacy    Was the patient seen in the last year in this department? Yes    Does the patient have an active prescription (recently filled or refills available) for medication(s) requested? No    Pharmacy Name:  [id number]  [place], [place] - [address]     Does the patient have [facility] and need 100 day supply (blood pressure, diabetes and cholesterol meds only)? Patient does not have SCP

## 2024-06-19 ENCOUNTER — RESEARCH ENCOUNTER (OUTPATIENT)
Dept: RESEARCH | Facility: MEDICAL CENTER | Age: 44
End: 2024-06-19
Payer: COMMERCIAL

## 2024-07-16 ENCOUNTER — DOCUMENTATION (OUTPATIENT)
Dept: BEHAVIORAL HEALTH | Facility: CLINIC | Age: 44
End: 2024-07-16
Payer: COMMERCIAL

## 2024-07-16 ENCOUNTER — HOSPITAL ENCOUNTER (OUTPATIENT)
Dept: BEHAVIORAL HEALTH | Facility: MEDICAL CENTER | Age: 44
End: 2024-07-16
Attending: FAMILY MEDICINE
Payer: COMMERCIAL

## 2024-07-16 VITALS
OXYGEN SATURATION: 99 % | DIASTOLIC BLOOD PRESSURE: 70 MMHG | BODY MASS INDEX: 23.33 KG/M2 | HEIGHT: 69 IN | HEART RATE: 73 BPM | SYSTOLIC BLOOD PRESSURE: 122 MMHG

## 2024-07-16 DIAGNOSIS — R41.840 ATTENTION DEFICIT: ICD-10-CM

## 2024-07-16 DIAGNOSIS — Z79.899 HIGH RISK MEDICATION USE: ICD-10-CM

## 2024-07-16 DIAGNOSIS — F90.0 ATTENTION DEFICIT HYPERACTIVITY DISORDER, INATTENTIVE TYPE: ICD-10-CM

## 2024-07-16 PROCEDURE — 99204 OFFICE O/P NEW MOD 45 MIN: CPT | Performed by: FAMILY MEDICINE

## 2024-07-16 PROCEDURE — 99214 OFFICE O/P EST MOD 30 MIN: CPT | Performed by: FAMILY MEDICINE

## 2024-07-16 RX ORDER — DEXTROAMPHETAMINE SACCHARATE, AMPHETAMINE ASPARTATE MONOHYDRATE, DEXTROAMPHETAMINE SULFATE AND AMPHETAMINE SULFATE 2.5; 2.5; 2.5; 2.5 MG/1; MG/1; MG/1; MG/1
10 CAPSULE, EXTENDED RELEASE ORAL EVERY MORNING
Qty: 30 CAPSULE | Refills: 0 | Status: SHIPPED | OUTPATIENT
Start: 2024-07-16 | End: 2024-07-30

## 2024-07-16 ASSESSMENT — ENCOUNTER SYMPTOMS
GASTROINTESTINAL NEGATIVE: 1
BRUISES/BLEEDS EASILY: 0
MUSCULOSKELETAL NEGATIVE: 1
TINGLING: 0
CHILLS: 0
PSYCHIATRIC NEGATIVE: 1
RESPIRATORY NEGATIVE: 1
NAUSEA: 0
COUGH: 0
CONSTITUTIONAL NEGATIVE: 1
DOUBLE VISION: 0
MYALGIAS: 0
DEPRESSION: 0
FEVER: 0
CARDIOVASCULAR NEGATIVE: 1
NEUROLOGICAL NEGATIVE: 1
BLURRED VISION: 0
EYES NEGATIVE: 1
DIZZINESS: 0
HEARTBURN: 0
PALPITATIONS: 0
HEADACHES: 0
HEMOPTYSIS: 0

## 2024-07-16 ASSESSMENT — ANXIETY QUESTIONNAIRES
5. BEING SO RESTLESS THAT IT IS HARD TO SIT STILL: SEVERAL DAYS
7. FEELING AFRAID AS IF SOMETHING AWFUL MIGHT HAPPEN: NOT AT ALL
3. WORRYING TOO MUCH ABOUT DIFFERENT THINGS: NOT AT ALL
6. BECOMING EASILY ANNOYED OR IRRITABLE: SEVERAL DAYS
4. TROUBLE RELAXING: NOT AT ALL
2. NOT BEING ABLE TO STOP OR CONTROL WORRYING: NOT AT ALL
GAD7 TOTAL SCORE: 2
1. FEELING NERVOUS, ANXIOUS, OR ON EDGE: NOT AT ALL

## 2024-07-16 ASSESSMENT — PATIENT HEALTH QUESTIONNAIRE - PHQ9
SUM OF ALL RESPONSES TO PHQ QUESTIONS 1-9: 4
5. POOR APPETITE OR OVEREATING: 1 - SEVERAL DAYS
CLINICAL INTERPRETATION OF PHQ2 SCORE: 0

## 2024-07-26 ENCOUNTER — DOCUMENTATION (OUTPATIENT)
Dept: BEHAVIORAL HEALTH | Facility: CLINIC | Age: 44
End: 2024-07-26
Payer: COMMERCIAL

## 2024-07-30 ENCOUNTER — HOSPITAL ENCOUNTER (OUTPATIENT)
Dept: BEHAVIORAL HEALTH | Facility: MEDICAL CENTER | Age: 44
End: 2024-07-30
Attending: FAMILY MEDICINE
Payer: COMMERCIAL

## 2024-07-30 DIAGNOSIS — F90.0 ATTENTION DEFICIT HYPERACTIVITY DISORDER, INATTENTIVE TYPE: ICD-10-CM

## 2024-07-30 PROCEDURE — 99214 OFFICE O/P EST MOD 30 MIN: CPT | Performed by: FAMILY MEDICINE

## 2024-07-30 RX ORDER — DEXTROAMPHETAMINE SACCHARATE, AMPHETAMINE ASPARTATE, DEXTROAMPHETAMINE SULFATE AND AMPHETAMINE SULFATE 2.5; 2.5; 2.5; 2.5 MG/1; MG/1; MG/1; MG/1
10 TABLET ORAL EVERY MORNING
Qty: 30 TABLET | Refills: 0 | Status: SHIPPED | OUTPATIENT
Start: 2024-07-30 | End: 2024-08-29

## 2024-09-05 DIAGNOSIS — F90.0 ATTENTION DEFICIT HYPERACTIVITY DISORDER, INATTENTIVE TYPE: ICD-10-CM

## 2024-09-05 RX ORDER — DEXTROAMPHETAMINE SACCHARATE, AMPHETAMINE ASPARTATE, DEXTROAMPHETAMINE SULFATE AND AMPHETAMINE SULFATE 2.5; 2.5; 2.5; 2.5 MG/1; MG/1; MG/1; MG/1
10 TABLET ORAL EVERY MORNING
Qty: 30 TABLET | Refills: 0 | Status: SHIPPED | OUTPATIENT
Start: 2024-09-05 | End: 2024-10-05

## 2024-09-05 NOTE — TELEPHONE ENCOUNTER
Received request via: Patient    Was the patient seen in the last year in this department? Yes    Does the patient have an active prescription (recently filled or refills available) for medication(s) requested? No    Pharmacy Name: [facility]    Does the patient have [facility] and need 100-day supply? (This applies to ALL medications) Patient does not have SCP

## 2024-10-14 ENCOUNTER — PATIENT MESSAGE (OUTPATIENT)
Dept: BEHAVIORAL HEALTH | Facility: MEDICAL CENTER | Age: 44
End: 2024-10-14
Payer: COMMERCIAL

## 2024-10-14 DIAGNOSIS — F90.0 ATTENTION DEFICIT HYPERACTIVITY DISORDER, INATTENTIVE TYPE: ICD-10-CM

## 2024-10-14 DIAGNOSIS — Z79.899 HIGH RISK MEDICATION USE: ICD-10-CM

## 2024-10-15 RX ORDER — DEXTROAMPHETAMINE SACCHARATE, AMPHETAMINE ASPARTATE MONOHYDRATE, DEXTROAMPHETAMINE SULFATE AND AMPHETAMINE SULFATE 2.5; 2.5; 2.5; 2.5 MG/1; MG/1; MG/1; MG/1
10 CAPSULE, EXTENDED RELEASE ORAL EVERY MORNING
Qty: 30 CAPSULE | Refills: 0 | Status: SHIPPED | OUTPATIENT
Start: 2024-10-15 | End: 2024-11-14

## 2024-10-15 RX ORDER — DEXTROAMPHETAMINE SACCHARATE, AMPHETAMINE ASPARTATE, DEXTROAMPHETAMINE SULFATE AND AMPHETAMINE SULFATE 2.5; 2.5; 2.5; 2.5 MG/1; MG/1; MG/1; MG/1
10 TABLET ORAL EVERY MORNING
Qty: 30 TABLET | Refills: 0 | Status: SHIPPED | OUTPATIENT
Start: 2024-10-15 | End: 2024-11-14

## 2024-11-19 ENCOUNTER — HOSPITAL ENCOUNTER (OUTPATIENT)
Dept: BEHAVIORAL HEALTH | Facility: MEDICAL CENTER | Age: 44
End: 2024-11-19
Attending: FAMILY MEDICINE
Payer: COMMERCIAL

## 2024-11-19 DIAGNOSIS — F41.8 MIXED ANXIETY DEPRESSIVE DISORDER: ICD-10-CM

## 2024-11-19 DIAGNOSIS — F90.0 ATTENTION DEFICIT HYPERACTIVITY DISORDER, INATTENTIVE TYPE: ICD-10-CM

## 2024-11-19 PROCEDURE — 99214 OFFICE O/P EST MOD 30 MIN: CPT | Mod: 95 | Performed by: FAMILY MEDICINE

## 2024-11-19 RX ORDER — DEXTROAMPHETAMINE SACCHARATE, AMPHETAMINE ASPARTATE MONOHYDRATE, DEXTROAMPHETAMINE SULFATE AND AMPHETAMINE SULFATE 2.5; 2.5; 2.5; 2.5 MG/1; MG/1; MG/1; MG/1
10 CAPSULE, EXTENDED RELEASE ORAL EVERY MORNING
Qty: 30 CAPSULE | Refills: 0 | Status: SHIPPED | OUTPATIENT
Start: 2024-11-19 | End: 2024-12-19

## 2024-11-19 RX ORDER — OXCARBAZEPINE 150 MG/1
150 TABLET, FILM COATED ORAL 2 TIMES DAILY
Qty: 180 TABLET | Refills: 0 | Status: SHIPPED | OUTPATIENT
Start: 2024-11-19

## 2024-11-19 NOTE — PROGRESS NOTES
Virtual Visit: Established Patient   This visit was conducted via Teams using secure and encrypted videoconferencing technology.   The patient was in their home in the [facility].    The patient's identity was confirmed and verbal consent was obtained for this virtual visit.     Subjective:   CC: No chief complaint on file.      Anny Tian is a 44 y.o. female presenting for evaluation and management of:    No chief complaint on file.      HISTORY OF PRESENT ILLNESS: Patient is a 44 y.o. female established patient who presents today for a med refill of a controlled substance in addition to their other issues listed in the rest of the progress note    The patient is requesting medication for the following issue:adhd  Approximate date of onset of condition was years ago.    Current Problems:  ADHD      Patient Active Problem List    Diagnosis Date Noted    Hot flashes due to menopause 05/12/2020    Atypical chest pain 08/08/2019    Mixed anxiety depressive disorder 08/08/2019    Body mass index (BMI) of 21.0 to 21.9 in adult 07/19/2019    Gastritis due to Helicobacter species 08/20/2018    Tinea versicolor 06/27/2017    Gastroesophageal reflux disease 12/29/2014    Other benign neoplasm of skin, unspecified 10/06/2014    Palpitations 10/06/2014    Well adult health check 10/06/2014    Tachycardia 09/25/2013    Hay fever 09/25/2013    Hx of drug abuse ([facility]) 09/25/2013    Dysuria 09/25/2013    Lower abdominal pain 09/25/2013    History of alcoholism ([facility]) 04/19/2013       Allergies:Hydrocodone-acetaminophen, Benadryl allergy, and Vicodin [hydrocodone-acetaminophen]    Current Outpatient Medications   Medication Sig Dispense Refill    OXcarbazepine (TRILEPTAL) 150 MG Tab Take 1 Tablet by mouth 2 times a day. 180 Tablet 0    amphetamine-dextroamphetamine XR (ADDERALL XR, 10MG,) 10 MG CAPSULE SR 24 HR Take 1 Capsule by mouth every morning for 30 days. 30 Capsule 0    thiamine (THIAMINE) 100 MG tablet TAKE ONE  TABLET BY MOUTH ONE TIME DAILY 30 Tablet 3    buPROPion SR (WELLBUTRIN-SR) 100 MG TABLET SR 12 HR Take 100 mg by mouth every morning.      L-Theanine 100 MG Cap Take  by mouth.      pyridoxine (VITAMIN B-6) 50 MG Tab Take 50 mg by mouth every day.      Calcium Citrate 150 MG Cap Take  by mouth.      ASHWAGANDHA PO Take  by mouth.      magnesium citrate Solution Take 300 mL by mouth one time.      naltrexone (DEPADE) 50 MG Tab Take 0.5 Tablets by mouth every day. (Patient not taking: Reported on 8/22/2023) 25 Tablet 0    Zinc 50 MG Tab Take 50 mg by mouth every 48 hours.      buPROPion SR (WELLBUTRIN-SR) 150 MG TABLET SR 12 HR sustained-release tablet Take 150 Tablets by mouth every day. (Patient not taking: Reported on 8/22/2023)      Magnesium 400 MG Tab Take 400 mg by mouth every day.      Omega-3 Fatty Acids (FISH OIL) 1000 MG Cap capsule Take 1,000 mg by mouth 3 times a day with meals.      therapeutic multivitamin-minerals (THERAGRAN-M) Tab Take 1 Tablet by mouth every day.      Cholecalciferol (VITAMIN D) 50 MCG (2000 UT) Cap Take 4,000 Units by mouth.      Melatonin 10 MG Tab Take  by mouth.       No current facility-administered medications for this encounter.         I have discussed with the patient that with any controlled substance prescription it is vital to have these medications in a safe, secure environment. I have discussed that it is their responsibility that their medications are not accessible to anyone else who may use them inadvertently.    I have discussed with the patient that any controlled substance can impair the ability to drive or operate any machinery and that the patient should not use them if they plan on driving or operating machinery.    I have reviewed and updated the past medical/surgical, family history and social history as of today.    ROS:  Review of Systems   Constitutional: Negative for fever, chills, weight loss and malaise/fatigue.   Respiratory: Negative for cough, sputum  production, shortness of breath and wheezing.    Cardiovascular: Negative for chest pain, palpitations, orthopnea and leg swelling.   Gastrointestinal: Negative for heartburn, nausea, vomiting, constipation and abdominal pain.  Musculoskeletal:  neg  Skin: Negative for rash and itching.   Neurological: neg  Psychiatric/Behavioral: Negative for signs or symptoms of depression,, suicidal or homicidal ideation.  Patient able to contract for their safety.  + anxiety  All other systems reviewed and are negative except as in HPI.      Exam:  There were no vitals taken for this visit.  General:  female patient who appears in no distress        DISCUSSION OF CARE PLAN:    - I discussed management options. I reviewed symptomatic care     - I will obtain/review additional records if needed    - I discussed efforts with home exercise program and activity modification     - I reviewed medication monitoring.       The patient was advised to avoid alcohol use with prescribed medication. I counseled the patient regarding risks, side effects, and interactions of medications. I counseled the patient on the controlled substance prescribing program. I reviewed further symptomatic medications.  - I reviewed additional diagnostic options: Yes.  - I reviewed additional therapeutic options: Yes  - I reviewed psychosocial interventions:  Yes      Assessment/Plan:  1. Attention deficit hyperactivity disorder, inattentive type  amphetamine-dextroamphetamine XR (ADDERALL XR, 10MG,) 10 MG CAPSULE SR 24 HR      2. Mixed anxiety depressive disorder  OXcarbazepine (TRILEPTAL) 150 MG Tab          A Controlled Substance Agreement has been signed within the last year. A urine drug screen has been done in the past year.      The patient reports that their insomnia is well controlled with the current treatment plan  They were counseled on other means to help with sleep   This patient is continuing to use a controlled substance (CS) on a long term  basis.  The patient is thoroughly aware of the goals of treatment with the CS  The patient is aware that yearly and random urine drug screens are required.  The patient has been instructed to take the CS only as prescribed.  The patient is prohibited from sharing the CS with any other person.  The patient is instructed to inform the provider if any other CS is taken, of any alcohol or cannabis or other recreational drug use, any treatment for side effects of the CS or complications, if they have CS active rx in other states  The patient has evidence for a reason for the CS  The treatment plan has been discussed with the patient  The  report has been reviewed            ROS       Current medicines (including changes today)  Current Outpatient Medications   Medication Sig Dispense Refill    OXcarbazepine (TRILEPTAL) 150 MG Tab Take 1 Tablet by mouth 2 times a day. 180 Tablet 0    amphetamine-dextroamphetamine XR (ADDERALL XR, 10MG,) 10 MG CAPSULE SR 24 HR Take 1 Capsule by mouth every morning for 30 days. 30 Capsule 0    thiamine (THIAMINE) 100 MG tablet TAKE ONE TABLET BY MOUTH ONE TIME DAILY 30 Tablet 3    buPROPion SR (WELLBUTRIN-SR) 100 MG TABLET SR 12 HR Take 100 mg by mouth every morning.      L-Theanine 100 MG Cap Take  by mouth.      pyridoxine (VITAMIN B-6) 50 MG Tab Take 50 mg by mouth every day.      Calcium Citrate 150 MG Cap Take  by mouth.      ASHWAGANDHA PO Take  by mouth.      magnesium citrate Solution Take 300 mL by mouth one time.      naltrexone (DEPADE) 50 MG Tab Take 0.5 Tablets by mouth every day. (Patient not taking: Reported on 8/22/2023) 25 Tablet 0    Zinc 50 MG Tab Take 50 mg by mouth every 48 hours.      buPROPion SR (WELLBUTRIN-SR) 150 MG TABLET SR 12 HR sustained-release tablet Take 150 Tablets by mouth every day. (Patient not taking: Reported on 8/22/2023)      Magnesium 400 MG Tab Take 400 mg by mouth every day.      Omega-3 Fatty Acids (FISH OIL) 1000 MG Cap capsule Take 1,000 mg  by mouth 3 times a day with meals.      therapeutic multivitamin-minerals (THERAGRAN-M) Tab Take 1 Tablet by mouth every day.      Cholecalciferol (VITAMIN D) 50 MCG (2000 UT) Cap Take 4,000 Units by mouth.      Melatonin 10 MG Tab Take  by mouth.       No current facility-administered medications for this encounter.       Patient Active Problem List    Diagnosis Date Noted    Hot flashes due to menopause 05/12/2020    Atypical chest pain 08/08/2019    Mixed anxiety depressive disorder 08/08/2019    Body mass index (BMI) of 21.0 to 21.9 in adult 07/19/2019    Gastritis due to Helicobacter species 08/20/2018    Tinea versicolor 06/27/2017    Gastroesophageal reflux disease 12/29/2014    Other benign neoplasm of skin, unspecified 10/06/2014    Palpitations 10/06/2014    Well adult health check 10/06/2014    Tachycardia 09/25/2013    Hay fever 09/25/2013    Hx of drug abuse ([facility]) 09/25/2013    Dysuria 09/25/2013    Lower abdominal pain 09/25/2013    History of alcoholism ([facility]) 04/19/2013        Objective:   There were no vitals taken for this visit.    Physical Exam:  Constitutional: Alert, no distress, well-groomed.  Skin: No rashes in visible areas.  Eye: Round. Conjunctiva clear, lids normal. No icterus.   ENMT: Lips pink without lesions, good dentition, moist mucous membranes. Phonation normal.  Neck: No masses, no thyromegaly. Moves freely without pain.  Respiratory: Unlabored respiratory effort, no cough or audible wheeze  Psych: Alert and oriented x3, normal affect and mood.     Assessment and Plan:   The following treatment plan was discussed:     1. Attention deficit hyperactivity disorder, inattentive type  - amphetamine-dextroamphetamine XR (ADDERALL XR, 10MG,) 10 MG CAPSULE SR 24 HR; Take 1 Capsule by mouth every morning for 30 days.  Dispense: 30 Capsule; Refill: 0    2. Mixed anxiety depressive disorder  - OXcarbazepine (TRILEPTAL) 150 MG Tab; Take 1 Tablet by mouth 2 times a day.  Dispense: 180 Tablet;  Refill: 0      Follow-up: No follow-ups on file.

## 2024-12-19 DIAGNOSIS — F90.0 ATTENTION DEFICIT HYPERACTIVITY DISORDER, INATTENTIVE TYPE: ICD-10-CM

## 2024-12-19 RX ORDER — DEXTROAMPHETAMINE SACCHARATE, AMPHETAMINE ASPARTATE MONOHYDRATE, DEXTROAMPHETAMINE SULFATE AND AMPHETAMINE SULFATE 2.5; 2.5; 2.5; 2.5 MG/1; MG/1; MG/1; MG/1
10 CAPSULE, EXTENDED RELEASE ORAL EVERY MORNING
Qty: 30 CAPSULE | Refills: 0 | Status: SHIPPED | OUTPATIENT
Start: 2024-12-19 | End: 2025-01-18

## 2024-12-19 NOTE — TELEPHONE ENCOUNTER
Received request via: Patient    Was the patient seen in the last year in this department? Yes    Does the patient have an active prescription (recently filled or refills available) for medication(s) requested? No    Pharmacy Name: [facility]     Does the patient have [facility] and need 100-day supply? (This applies to ALL medications) Patient does not have SCP

## 2024-12-20 NOTE — H&P
Attending Physician: Ceasar Wallis .M.D      CC: SCHEDULED FOR    HYSTEROSCOPY AND RESECTION OF POLYP USING ROTATING CUTER BLADE DILATION AND CURRETAGE AND DILATION CURRETAGE  here to discuss  The   proposed surgery .    HPI: AUB         Social History     Socioeconomic History    Marital status: Single     Spouse name: Not on file    Number of children: Not on file    Years of education: Not on file    Highest education level: Not on file   Occupational History    Not on file   Tobacco Use    Smoking status: Never    Smokeless tobacco: Never   Vaping Use    Vaping status: Never Used   Substance and Sexual Activity    Alcohol use: Yes     Alcohol/week: 4.8 oz     Types: 8 Glasses of wine per week     Comment: 8-14 drinks a week    Drug use: No    Sexual activity: Yes     Partners: Male     Comment: none    Other Topics Concern    Not on file   Social History Narrative    Works as a speech pathologist    Lives with adult daughter and partner     Social Drivers of Health     Financial Resource Strain: Not on file   Food Insecurity: Not on file   Transportation Needs: Not on file   Physical Activity: Not on file   Stress: Not on file   Social Connections: Not on file   Intimate Partner Violence: Not on file   Housing Stability: Not on file       Active Ambulatory Problems     Diagnosis Date Noted    History of alcoholism (HCC) 04/19/2013    Tachycardia 09/25/2013    Hay fever 09/25/2013    Hx of drug abuse (HCC) 09/25/2013    Dysuria 09/25/2013    Lower abdominal pain 09/25/2013    Atypical chest pain 08/08/2019    Body mass index (BMI) of 21.0 to 21.9 in adult 07/19/2019    Gastritis due to Helicobacter species 08/20/2018    Gastroesophageal reflux disease 12/29/2014    Hot flashes due to menopause 05/12/2020    Mixed anxiety depressive disorder 08/08/2019    Other benign neoplasm of skin, unspecified 10/06/2014    Palpitations 10/06/2014    Well adult health check 10/06/2014    Tinea versicolor 06/27/2017      Resolved Ambulatory Problems     Diagnosis Date Noted    Habitual aborter- currently pregnant 04/19/2013     Past Medical History:   Diagnosis Date    Allergy     Arrhythmia     Headache(784.0)     Substance abuse ([facility])        No current facility-administered medications on file prior to encounter.     Current Outpatient Medications on File Prior to Encounter   Medication Sig Dispense Refill    amphetamine-dextroamphetamine XR (ADDERALL XR, 10MG,) 10 MG CAPSULE SR 24 HR Take 1 Capsule by mouth every morning for 30 days. 30 Capsule 0    OXcarbazepine (TRILEPTAL) 150 MG Tab Take 1 Tablet by mouth 2 times a day. 180 Tablet 0    thiamine (THIAMINE) 100 MG tablet TAKE ONE TABLET BY MOUTH ONE TIME DAILY 30 Tablet 3    buPROPion SR (WELLBUTRIN-SR) 100 MG TABLET SR 12 HR Take 100 mg by mouth every morning.      L-Theanine 100 MG Cap Take  by mouth.      pyridoxine (VITAMIN B-6) 50 MG Tab Take 50 mg by mouth every day.      Calcium Citrate 150 MG Cap Take  by mouth.      ASHWAGANDHA PO Take  by mouth.      magnesium citrate Solution Take 300 mL by mouth one time.      naltrexone (DEPADE) 50 MG Tab Take 0.5 Tablets by mouth every day. (Patient not taking: Reported on 8/22/2023) 25 Tablet 0    Zinc 50 MG Tab Take 50 mg by mouth every 48 hours.      buPROPion SR (WELLBUTRIN-SR) 150 MG TABLET SR 12 HR sustained-release tablet Take 150 Tablets by mouth every day. (Patient not taking: Reported on 8/22/2023)      Magnesium 400 MG Tab Take 400 mg by mouth every day.      Omega-3 Fatty Acids (FISH OIL) 1000 MG Cap capsule Take 1,000 mg by mouth 3 times a day with meals.      therapeutic multivitamin-minerals (THERAGRAN-M) Tab Take 1 Tablet by mouth every day.      Cholecalciferol (VITAMIN D) 50 MCG (2000 UT) Cap Take 4,000 Units by mouth.      Melatonin 10 MG Tab Take  by mouth.         Past Surgical History:   Procedure Laterality Date    DILATION AND CURETTAGE      3 TABS 1998,1999,2000    TONSILLECTOMY      Childhood        Family History   Problem Relation Age of Onset    Osteoporosis Mother     Other Mother     Alcohol/Drug Brother     Depression Brother     Stroke Paternal Grandmother     Stroke Paternal Grandfather        Allergies   Allergen Reactions    Hydrocodone-Acetaminophen      Other reaction(s): BLOOD PRESSURE DROOP, VOMIT, FASTER HEART BEAT    Benadryl Allergy     Vicodin [Hydrocodone-Acetaminophen]        Review of Systems:     Constitutional: Negative for fever, chills, weight loss, malaise/fatigue and diaphoresis.   HENT: Negative for hearing loss, ear pain, and ear discharge.   Eyes: Negative for blurred vision, double vision,  and redness.   Respiratory: Negative for cough, hemoptysis, sputum production, shortness of breath, wheezing and stridor.   Cardiovascular: Negative for chest pain, palpitations, orthopnea,and PND.   Gastrointestinal: regular bowel and bladder habits   Genitourinary: Negative for dysuria, urgency, frequency, hematuria and flank pain.   Menstrual: AUB .  Musculoskeletal: Negative for myalgias, back pain, joint pain and falls.   Skin: Negative for itching and rash.   Neurological: Negative for dizziness, speech change, focal weakness, seizures, loss of consciousness, weakness and headaches.   Endo/Heme/Allergies: Negative for environmental allergies and polydipsia. Does not bruise/bleed easily.   Psychiatric/Behavioral: Negative for depression, suicidal ideas, hallucinations, memory loss and substance abuse. The patient is not nervous/anxious and does not have insomnia.   Physical Exam:     There were no vitals filed for this visit.    Constitutional: she is oriented to person, place, and time. she appears well-developed and well-nourished. No  distress.   Head: Normocephalic and atraumatic.   Neck: Normal range of motion. Neck supple. No JVD present. No tracheal deviation present. No thyromegaly present.   Cardiovascular: Normal rate, regular rhythm, normal heart sounds and intact distal  pulses. Exam reveals no gallop and no friction rub. No murmur heard.   Pulmonary/Chest: Effort normal and breath sounds normal. No stridor. No respiratory distress. she has no wheezes. She has no rales.     Abdominal: Soft. There is no tenderness. There is no rebound and no guarding.     Musculoskeletal: Normal range of motion. she exhibits no edema and no tenderness.   Neurological: she is alert and oriented to person, place, and time. she has normal reflexes. No cranial nerve deficit. Coordination normal.   Skin: Skin is warm and dry. No rash noted. She is diaphoretic. No erythema. No pallor.   Psychiatric: she has a normal mood and affect. Behavior is normal.     Assessment:     43 y/o G [id number]    AUB    for more than a year has had heavy vaginal bleeding  h/o perimenopausal symptoms has been on bioindetical progestrone for years. stopped last year has been taking vaginal testosterone for low libido also was seeing Dr.beth Oliva for vaginal laser.   h/o Leep procedure Dr.Mc Wise 2001 since has normal pap last pap 1/2024 Dr.Beth Oliva   takes risparedone for years for sleep also took well butrin and riparidone stopped 4 months ago.   partner had vasectomy, but breaking up with him desires contraception talked about Lilletta IUD for contraception asnd irregualr bleeding.   UPT negative BHCG slip gibella  ( pt had negative UPT but positive blood test before )  pelvic ultrasound - uterus 10x 6 x5 cms     ET 11 mm cystic structure @ fundus.  heterogenous uterus.  suggestive of adenomyosis  2.5cms right ovarin cyst stuck to ovary     left ovary shows small cyst and daughter cyst.     8/2024- EMB-  benign     pt concerned of pregnancy (partner had vasectomy)  BHCG - negative . discused about ovarin cyst only way to rule out cancer is to cytectomy and send for testing. other optiion is observation and f/u in 3-6mths to see if resolves     adneomoyiosis is endometriosis in wall of uterus causing bulky uterus and heavy  bleeding treatment is hysterectomy, hormones seems to grow it further.      will consider Hysteroscopy  D&C  for cystic structure  option of IUD or ablation additionally was reviewed and pt declined both.      risks benefits  discussed about Hystreposcopy and D&C.   Pt wants to go ahed with it.     more than 50 % of 45 mts spent on couselling.            Plan:  She agrees and electes to proceed with surgery as scheduled.     Complete discussion regarding the proposed procedure was conducted both today and throughout the entire kaci-operative period. The procedure: HYSTEROSCOPY AND RESECTION OF POLYP USING ROTATING CUTER BLADE DILATION AND CURRETAGE AND DILATION CURRETAGE.  Was specifically addressed. There is increased risk from  any surgical procedure related to  Anesthesia, increased risk of infection, both abdominal and wound infections as well as heavy bleeding, both during surgery, or delayed bleeding.  We discussed in detail the surgical and post operative expcetations ,including activity restrictions,Wound care etc   pre-operative instructions given aware to be NPO after midnight prior to surgery and stop necessary medications according to pre-op recomendations. . All questions were answered to   their satisfaction. We will follow closely.                               __________________

## 2024-12-26 ENCOUNTER — OFFICE VISIT (OUTPATIENT)
Dept: URGENT CARE | Facility: CLINIC | Age: 44
End: 2024-12-26
Payer: COMMERCIAL

## 2024-12-26 VITALS
BODY MASS INDEX: 23.19 KG/M2 | HEIGHT: 68 IN | WEIGHT: 153 LBS | TEMPERATURE: 98.7 F | OXYGEN SATURATION: 99 % | HEART RATE: 89 BPM | RESPIRATION RATE: 14 BRPM | SYSTOLIC BLOOD PRESSURE: 102 MMHG | DIASTOLIC BLOOD PRESSURE: 66 MMHG

## 2024-12-26 DIAGNOSIS — R68.84 JAW PAIN: ICD-10-CM

## 2024-12-26 DIAGNOSIS — R10.11 RUQ PAIN: ICD-10-CM

## 2024-12-26 DIAGNOSIS — R07.9 CHEST PAIN, UNSPECIFIED TYPE: ICD-10-CM

## 2024-12-26 LAB
APPEARANCE UR: CLEAR
BILIRUB UR STRIP-MCNC: NEGATIVE MG/DL
COLOR UR AUTO: YELLOW
GLUCOSE UR STRIP.AUTO-MCNC: NEGATIVE MG/DL
KETONES UR STRIP.AUTO-MCNC: NORMAL MG/DL
LEUKOCYTE ESTERASE UR QL STRIP.AUTO: NEGATIVE
NITRITE UR QL STRIP.AUTO: NEGATIVE
PH UR STRIP.AUTO: 6 [PH] (ref 5–8)
PROT UR QL STRIP: NEGATIVE MG/DL
RBC UR QL AUTO: NEGATIVE
SP GR UR STRIP.AUTO: <=1.005
UROBILINOGEN UR STRIP-MCNC: NORMAL MG/DL

## 2024-12-26 PROCEDURE — 99214 OFFICE O/P EST MOD 30 MIN: CPT | Performed by: PHYSICIAN ASSISTANT

## 2024-12-26 PROCEDURE — 3074F SYST BP LT 130 MM HG: CPT | Performed by: PHYSICIAN ASSISTANT

## 2024-12-26 PROCEDURE — 81002 URINALYSIS NONAUTO W/O SCOPE: CPT | Performed by: PHYSICIAN ASSISTANT

## 2024-12-26 PROCEDURE — 3078F DIAST BP <80 MM HG: CPT | Performed by: PHYSICIAN ASSISTANT

## 2024-12-27 NOTE — PROGRESS NOTES
Subjective:     Verbal consent was acquired by the patient to use [employer] ambient listening note generation during this visit     Anny Tian is a 44 y.o. female who presents for Jaw Pain (Abdomen pain, right abdomen pain midline chest pain/Pt states having heart attack sxs last night)      History of Present Illness  The patient presents for evaluation of jaw pain, chest pain, abdominal pain, and menorrhagia.    She experienced an episode of severe right sided jaw pain last night, which radiated to her ear. This was accompanied by intense right-sided chest pain, initially thought to be a cracked rib due to its severity. The onset of these symptoms occurred hours after [place] dinner. She also reported experiencing right sided chest pain while washing her hands in the bathroom prior to her visit today. She reports no respiratory symptoms such as cough, cold, runny nose, or congestion. She also reports no fevers or chills. She is concerned about the possibility of a silent heart attack. She has not had a recurrence of the jaw or ear pain since the previous night.  She points to the right upper quadrant.    Concurrently, she experienced severe lower abdominal pain, prompting her to rush to the bathroom. She described a sensation of her body being on fire, profuse sweating, and nausea. These symptoms subsided following a bowel movement, which she reports was neither constipated nor diarrheal in nature. She had a similar episode with another bowel movement 15 minutes later. She continues to experience intermittent abdominal pain and pressure, which has led to a fear of eating. She retains her gallbladder. She has been diagnosed with celiac disease and had consumed gluten at a family party the previous day, which she suspects may have triggered her symptoms. She reports no current abdominal pain.        FAMILY HISTORY  Her mother had her gallbladder removed and had a lot of issues with  "it.    MEDICATIONS  Adderall            Medications:  amphetamine-dextroamphetamine XR Cp24  ASHWAGANDHA PO  buPROPion SR Tb12  Calcium Citrate Caps  fish oil Caps  L-Theanine Caps  magnesium citrate Soln  Magnesium Tabs  Melatonin Tabs  naltrexone Tabs  OXcarbazepine Tabs  pyridoxine Tabs  therapeutic multivitamin-minerals Tabs  thiamine  Vitamin D Caps  Zinc Tabs    Allergies:             Hydrocodone-acetaminophen, Benadryl allergy, and Vicodin [hydrocodone-acetaminophen]    Past Social Hx:  Anny Tian  reports that she has never smoked. She has never used smokeless tobacco. She reports current alcohol use of about 4.8 oz of alcohol per week. She reports that she does not use drugs.           Problem list, medications, and allergies reviewed by myself today in Epic.     Objective:     /66 (BP Location: Left arm, Patient Position: Sitting, BP Cuff Size: Small adult)   Pulse 89   Temp 37.1 °C (98.7 °F) (Temporal)   Resp 14   Ht 1.727 m (5' 8\")   Wt 69.4 kg (153 lb)   SpO2 99%   BMI 23.26 kg/m²     Physical Exam  Vitals and nursing note reviewed.   Constitutional:       General: She is not in acute distress.     Appearance: Normal appearance. She is not ill-appearing, toxic-appearing or diaphoretic.   HENT:      Head:        Comments: Some popping noted to tmj with opening and closing of the jaw.       Right Ear: Hearing, tympanic membrane, ear canal and external ear normal. No decreased hearing noted. No laceration, drainage, swelling or tenderness. No middle ear effusion. No foreign body. No mastoid tenderness. No PE tube. Tympanic membrane is not injected, scarred, perforated or erythematous. Tympanic membrane has normal mobility.      Nose: Nose normal.      Mouth/Throat:      Mouth: Mucous membranes are moist.      Dentition: No gingival swelling or dental abscesses.      Pharynx: No oropharyngeal exudate or posterior oropharyngeal erythema.      Comments: No evidence of dental infection.  No " evidence of parotitis or mastoiditis.  Eyes:      Extraocular Movements: Extraocular movements intact.      Pupils: Pupils are equal, round, and reactive to light.   Cardiovascular:      Rate and Rhythm: Normal rate and regular rhythm.      Pulses: Normal pulses.      Heart sounds: Normal heart sounds.   Pulmonary:      Effort: Pulmonary effort is normal. No tachypnea, accessory muscle usage, prolonged expiration, respiratory distress or retractions.      Breath sounds: Normal breath sounds and air entry. No stridor or decreased air movement. No decreased breath sounds, wheezing, rhonchi or rales.      Comments: Lungs cta b/l  Abdominal:      General: Abdomen is flat. Bowel sounds are normal. There is no distension. There are no signs of injury.      Palpations: Abdomen is soft. There is no splenomegaly, mass or pulsatile mass.      Tenderness: There is abdominal tenderness in the right upper quadrant. There is no right CVA tenderness, left CVA tenderness, guarding or rebound. Negative signs include Short's sign, Rovsing's sign and McBurney's sign.      Hernia: No hernia is present.       Musculoskeletal:      Cervical back: No rigidity.   Lymphadenopathy:      Cervical: No cervical adenopathy.   Neurological:      Mental Status: She is alert.             EKG sinus rhythm rate 72, no obvious ST or T wave changes.  No Q waves.  No arrhythmia.  Normal QT    Assessment/Plan:     Diagnosis and Associated Orders:     1. Jaw pain  - EKG - Clinic Performed    2. Chest pain, unspecified type  - EKG - Clinic Performed    3. RUQ pain  - POCT Urinalysis  - US-RUQ; Future        Medical Decision Making:    Pleasant 44 y.o. presents to clinic with:    Assessment & Plan  1. Jaw pain.  The patient experienced intense jaw pain radiating to the ear, which was severe enough to cause significant distress. It has since resolved. An EKG demonstrates no abnormalities.  There is no ttp to the right parotid gland, no evidence of om, no  evidence of dental infection.  May be tmj.  Soft foods, ibuprofen/tylenol prn.    2. Chest pain.  The patient reported severe chest pain that felt like a cracked rib, which was exacerbated by lying down only on the right. The pain was right-sided and associated with episodes of dizziness, potentially related to her Adderall use and history of low blood pressure. An EKG wnl.  Symtpoms have resolved.  Discussed strict er precautions. No major cardiac risk factors.  VSS and reassuring    3. Abdominal pain.  The patient experienced severe lower abdominal pain followed by a bowel movement, which alleviated the pain. She has a history of celiac disease and had consumed gluten at a family party the previous day. The pain was not associated with constipation or diarrhea.  Pain did follow ingestion of holiday food and she does have some ruq tenderness.  Will order f/u US to r/o hepatobiliary involvement.    4. Menorrhagia.  The patient has been diagnosed with severe menorrhagia and has a procedure scheduled. She is currently taking Adderall and expressed concerns about its effects on her condition.  38  minutes was allotted and spent for patient care and coordination of care (not reported separately) including preparing for the visit, obtaining/reviewing history from/with patient, performing an exam/evaluation, ordering Rx, developing a plan of care, counseling/educating the patient, developing the discharge summary for release to [facility], and documentation. Time was spent educating patient regarding differential diagnosis as well as indications for presentation to ER.  This template was updated to summarize care specific to this encounter.    I personally reviewed prior external notes and test results pertinent to today's visit. Patient is clinically stable at today's urgent care visit.  Patient appears nontoxic with no acute distress noted. Appropriate for outpatient care at this time.  Return to clinic or go to ED if  symptoms worsen or persist.  Red flag symptoms discussed.  Patient/Parent/Guardian voices understanding.   All side effects of medication discussed including allergic response, GI upset, tendon injury, rash, sedation etc    Please note that this dictation was created using voice recognition software. I have made a reasonable attempt to correct obvious errors, but I expect that there are errors of grammar and possibly content that I did not discover before finalizing the note.    This note was electronically signed by Ilda Cadet PA-C

## 2024-12-30 ENCOUNTER — PRE-ADMISSION TESTING (OUTPATIENT)
Dept: ADMISSIONS | Facility: MEDICAL CENTER | Age: 44
End: 2024-12-30
Attending: OBSTETRICS & GYNECOLOGY
Payer: COMMERCIAL

## 2025-01-06 ENCOUNTER — PRE-ADMISSION TESTING (OUTPATIENT)
Dept: ADMISSIONS | Facility: MEDICAL CENTER | Age: 45
End: 2025-01-06
Attending: OBSTETRICS & GYNECOLOGY
Payer: COMMERCIAL

## 2025-01-06 RX ORDER — VITAMIN B COMPLEX
CAPSULE ORAL DAILY
COMMUNITY

## 2025-01-06 NOTE — OR NURSING
RN tele pre admit appointment completed with patient:  Medication instructions given according to the Guideline for Pre-Operative Medication Management.  Patient aware medication instructions can be reviewed in the pre admit AVS note.    Preparing For Your Procedure packet reviewed with patient including fasting and bathing guidelines.  Patient instructed to stop taking all vitamins and herbal supplements 7 days prior to surgery and instructed to stop any NSAIDS 5 days prior to surgery unless otherwise instructed by medical provider. Surgery date 1/8/2025.    During pre admit appointment this RN encouraged patient to increase fluid intake the day prior to surgery including intake of electrolyte drinks such as Gatorade or electrolyte water and patient may have clear liquids until 2 hours prior to procedure.     Patient verbalizes understanding of all instructions given. No further questions at this time.

## 2025-01-06 NOTE — PREADMIT AVS NOTE
Current Medications   Medication Instructions    B Complex Cap Stop 7 days before surgery    Multiple Vitamins-Minerals (MULTIVITAMIN WOMEN PO) Stop 7 days before surgery    MAGNESIUM PO Stop 7 days before surgery    OXcarbazepine (TRILEPTAL) 150 MG Tab Ok to continue as prescribed unless surgeon instructs differently    Magnesium 400 MG Tab Stop 7 days before surgery    amphetamine-dextroamphetamine XR (ADDERALL XR, 10MG,) 10 MG CAPSULE SR 24 HR Ok to continue as prescribed unless surgeon instructs differently    thiamine (THIAMINE) 100 MG tablet Stop 7 days before surgery    L-Theanine 100 MG Cap Stop 7 days before surgery    Calcium Citrate 150 MG Cap Stop 7 days before surgery    ASHWAGANDHA PO Stop 7 days before surgery    Zinc 50 MG Tab Stop 7 days before surgery    Omega-3 Fatty Acids (FISH OIL) 1000 MG Cap capsule Stop 7 days before surgery

## 2025-01-08 ENCOUNTER — HOSPITAL ENCOUNTER (OUTPATIENT)
Facility: MEDICAL CENTER | Age: 45
End: 2025-01-08
Attending: OBSTETRICS & GYNECOLOGY | Admitting: OBSTETRICS & GYNECOLOGY
Payer: COMMERCIAL

## 2025-01-08 ENCOUNTER — ANESTHESIA EVENT (OUTPATIENT)
Dept: SURGERY | Facility: MEDICAL CENTER | Age: 45
End: 2025-01-08
Payer: COMMERCIAL

## 2025-01-08 ENCOUNTER — ANESTHESIA (OUTPATIENT)
Dept: SURGERY | Facility: MEDICAL CENTER | Age: 45
End: 2025-01-08
Payer: COMMERCIAL

## 2025-01-08 VITALS
RESPIRATION RATE: 20 BRPM | TEMPERATURE: 97.4 F | SYSTOLIC BLOOD PRESSURE: 111 MMHG | DIASTOLIC BLOOD PRESSURE: 69 MMHG | HEIGHT: 68 IN | HEART RATE: 70 BPM | BODY MASS INDEX: 22.85 KG/M2 | WEIGHT: 150.79 LBS | OXYGEN SATURATION: 98 %

## 2025-01-08 DIAGNOSIS — Z98.890 POST-OPERATIVE STATE: ICD-10-CM

## 2025-01-08 LAB
HCG UR QL: NEGATIVE
PATHOLOGY CONSULT NOTE: NORMAL

## 2025-01-08 PROCEDURE — 160002 HCHG RECOVERY MINUTES (STAT): Performed by: OBSTETRICS & GYNECOLOGY

## 2025-01-08 PROCEDURE — 700101 HCHG RX REV CODE 250: Performed by: ANESTHESIOLOGY

## 2025-01-08 PROCEDURE — 160009 HCHG ANES TIME/MIN: Performed by: OBSTETRICS & GYNECOLOGY

## 2025-01-08 PROCEDURE — 160029 HCHG SURGERY MINUTES - 1ST 30 MINS LEVEL 4: Performed by: OBSTETRICS & GYNECOLOGY

## 2025-01-08 PROCEDURE — 160046 HCHG PACU - 1ST 60 MINS PHASE II: Performed by: OBSTETRICS & GYNECOLOGY

## 2025-01-08 PROCEDURE — 700101 HCHG RX REV CODE 250: Performed by: OBSTETRICS & GYNECOLOGY

## 2025-01-08 PROCEDURE — 88305 TISSUE EXAM BY PATHOLOGIST: CPT

## 2025-01-08 PROCEDURE — 81025 URINE PREGNANCY TEST: CPT

## 2025-01-08 PROCEDURE — 160048 HCHG OR STATISTICAL LEVEL 1-5: Performed by: OBSTETRICS & GYNECOLOGY

## 2025-01-08 PROCEDURE — 88305 TISSUE EXAM BY PATHOLOGIST: CPT | Mod: 26 | Performed by: PATHOLOGY

## 2025-01-08 PROCEDURE — 160025 RECOVERY II MINUTES (STATS): Performed by: OBSTETRICS & GYNECOLOGY

## 2025-01-08 PROCEDURE — 700105 HCHG RX REV CODE 258: Performed by: OBSTETRICS & GYNECOLOGY

## 2025-01-08 PROCEDURE — 160035 HCHG PACU - 1ST 60 MINS PHASE I: Performed by: OBSTETRICS & GYNECOLOGY

## 2025-01-08 PROCEDURE — 700111 HCHG RX REV CODE 636 W/ 250 OVERRIDE (IP): Mod: JZ | Performed by: ANESTHESIOLOGY

## 2025-01-08 RX ORDER — EPHEDRINE SULFATE 50 MG/ML
INJECTION, SOLUTION INTRAVENOUS PRN
Status: DISCONTINUED | OUTPATIENT
Start: 2025-01-08 | End: 2025-01-08 | Stop reason: SURG

## 2025-01-08 RX ORDER — EPHEDRINE SULFATE 50 MG/ML
10 INJECTION, SOLUTION INTRAVENOUS
Status: DISCONTINUED | OUTPATIENT
Start: 2025-01-08 | End: 2025-01-08 | Stop reason: HOSPADM

## 2025-01-08 RX ORDER — SODIUM CHLORIDE, SODIUM LACTATE, POTASSIUM CHLORIDE, CALCIUM CHLORIDE 600; 310; 30; 20 MG/100ML; MG/100ML; MG/100ML; MG/100ML
INJECTION, SOLUTION INTRAVENOUS CONTINUOUS
Status: DISCONTINUED | OUTPATIENT
Start: 2025-01-08 | End: 2025-01-08 | Stop reason: HOSPADM

## 2025-01-08 RX ORDER — HYDROMORPHONE HYDROCHLORIDE 1 MG/ML
0.2 INJECTION, SOLUTION INTRAMUSCULAR; INTRAVENOUS; SUBCUTANEOUS
Status: DISCONTINUED | OUTPATIENT
Start: 2025-01-08 | End: 2025-01-08 | Stop reason: HOSPADM

## 2025-01-08 RX ORDER — DEXAMETHASONE SODIUM PHOSPHATE 4 MG/ML
INJECTION, SOLUTION INTRA-ARTICULAR; INTRALESIONAL; INTRAMUSCULAR; INTRAVENOUS; SOFT TISSUE PRN
Status: DISCONTINUED | OUTPATIENT
Start: 2025-01-08 | End: 2025-01-08 | Stop reason: SURG

## 2025-01-08 RX ORDER — HYDRALAZINE HYDROCHLORIDE 20 MG/ML
5 INJECTION INTRAMUSCULAR; INTRAVENOUS
Status: DISCONTINUED | OUTPATIENT
Start: 2025-01-08 | End: 2025-01-08 | Stop reason: HOSPADM

## 2025-01-08 RX ORDER — KETOROLAC TROMETHAMINE 15 MG/ML
INJECTION, SOLUTION INTRAMUSCULAR; INTRAVENOUS PRN
Status: DISCONTINUED | OUTPATIENT
Start: 2025-01-08 | End: 2025-01-08 | Stop reason: SURG

## 2025-01-08 RX ORDER — LIDOCAINE HYDROCHLORIDE 20 MG/ML
INJECTION, SOLUTION EPIDURAL; INFILTRATION; INTRACAUDAL; PERINEURAL PRN
Status: DISCONTINUED | OUTPATIENT
Start: 2025-01-08 | End: 2025-01-08 | Stop reason: SURG

## 2025-01-08 RX ORDER — ONDANSETRON 2 MG/ML
INJECTION INTRAMUSCULAR; INTRAVENOUS PRN
Status: DISCONTINUED | OUTPATIENT
Start: 2025-01-08 | End: 2025-01-08 | Stop reason: SURG

## 2025-01-08 RX ORDER — OXYCODONE AND ACETAMINOPHEN 5; 325 MG/1; MG/1
1 TABLET ORAL EVERY 4 HOURS PRN
Qty: 15 TABLET | Refills: 0 | Status: SHIPPED | OUTPATIENT
Start: 2025-01-08 | End: 2025-01-12

## 2025-01-08 RX ORDER — ACETAMINOPHEN 500 MG
1000 TABLET ORAL EVERY 6 HOURS PRN
Status: DISCONTINUED | OUTPATIENT
Start: 2025-01-08 | End: 2025-01-08 | Stop reason: HOSPADM

## 2025-01-08 RX ORDER — IBUPROFEN 600 MG/1
600 TABLET, FILM COATED ORAL EVERY 6 HOURS PRN
Qty: 30 TABLET | Refills: 0 | Status: SHIPPED | OUTPATIENT
Start: 2025-01-08

## 2025-01-08 RX ORDER — OXYCODONE HCL 5 MG/5 ML
5 SOLUTION, ORAL ORAL
Status: DISCONTINUED | OUTPATIENT
Start: 2025-01-08 | End: 2025-01-08 | Stop reason: HOSPADM

## 2025-01-08 RX ORDER — ALBUTEROL SULFATE 5 MG/ML
2.5 SOLUTION RESPIRATORY (INHALATION)
Status: DISCONTINUED | OUTPATIENT
Start: 2025-01-08 | End: 2025-01-08 | Stop reason: HOSPADM

## 2025-01-08 RX ORDER — MEPERIDINE HYDROCHLORIDE 25 MG/ML
12.5 INJECTION INTRAMUSCULAR; INTRAVENOUS; SUBCUTANEOUS
Status: DISCONTINUED | OUTPATIENT
Start: 2025-01-08 | End: 2025-01-08 | Stop reason: HOSPADM

## 2025-01-08 RX ORDER — OXYCODONE HCL 5 MG/5 ML
10 SOLUTION, ORAL ORAL
Status: DISCONTINUED | OUTPATIENT
Start: 2025-01-08 | End: 2025-01-08 | Stop reason: HOSPADM

## 2025-01-08 RX ORDER — HALOPERIDOL 5 MG/ML
1 INJECTION INTRAMUSCULAR
Status: DISCONTINUED | OUTPATIENT
Start: 2025-01-08 | End: 2025-01-08 | Stop reason: HOSPADM

## 2025-01-08 RX ORDER — HYDROMORPHONE HYDROCHLORIDE 1 MG/ML
0.1 INJECTION, SOLUTION INTRAMUSCULAR; INTRAVENOUS; SUBCUTANEOUS
Status: DISCONTINUED | OUTPATIENT
Start: 2025-01-08 | End: 2025-01-08 | Stop reason: HOSPADM

## 2025-01-08 RX ORDER — ONDANSETRON 2 MG/ML
4 INJECTION INTRAMUSCULAR; INTRAVENOUS
Status: DISCONTINUED | OUTPATIENT
Start: 2025-01-08 | End: 2025-01-08 | Stop reason: HOSPADM

## 2025-01-08 RX ORDER — HYDROMORPHONE HYDROCHLORIDE 1 MG/ML
0.4 INJECTION, SOLUTION INTRAMUSCULAR; INTRAVENOUS; SUBCUTANEOUS
Status: DISCONTINUED | OUTPATIENT
Start: 2025-01-08 | End: 2025-01-08 | Stop reason: HOSPADM

## 2025-01-08 RX ADMIN — KETOROLAC TROMETHAMINE 15 MG: 15 INJECTION, SOLUTION INTRAMUSCULAR; INTRAVENOUS at 13:46

## 2025-01-08 RX ADMIN — ONDANSETRON 4 MG: 2 INJECTION INTRAMUSCULAR; INTRAVENOUS at 13:45

## 2025-01-08 RX ADMIN — LIDOCAINE HYDROCHLORIDE 0.5 ML: 10 SOLUTION INTRAVENOUS at 13:10

## 2025-01-08 RX ADMIN — LIDOCAINE HYDROCHLORIDE 50 MG: 20 INJECTION, SOLUTION EPIDURAL; INFILTRATION; INTRACAUDAL; PERINEURAL at 13:30

## 2025-01-08 RX ADMIN — SODIUM CHLORIDE, POTASSIUM CHLORIDE, SODIUM LACTATE AND CALCIUM CHLORIDE: 600; 310; 30; 20 INJECTION, SOLUTION INTRAVENOUS at 13:10

## 2025-01-08 RX ADMIN — PROPOFOL 170 MG: 10 INJECTION, EMULSION INTRAVENOUS at 13:30

## 2025-01-08 RX ADMIN — FENTANYL CITRATE 50 MCG: 50 INJECTION, SOLUTION INTRAMUSCULAR; INTRAVENOUS at 13:30

## 2025-01-08 RX ADMIN — EPHEDRINE SULFATE 10 MG: 50 INJECTION, SOLUTION INTRAVENOUS at 13:39

## 2025-01-08 RX ADMIN — DEXAMETHASONE SODIUM PHOSPHATE 8 MG: 4 INJECTION INTRA-ARTICULAR; INTRALESIONAL; INTRAMUSCULAR; INTRAVENOUS; SOFT TISSUE at 13:33

## 2025-01-08 ASSESSMENT — PAIN DESCRIPTION - PAIN TYPE
TYPE: SURGICAL PAIN

## 2025-01-08 NOTE — ANESTHESIA PROCEDURE NOTES
Airway    Date/Time: 1/8/2025 1:30 PM    Performed by: Yfn Mcallister M.D.  Authorized by: Yfn Mcallister M.D.    Location:  OR  Urgency:  Elective  Difficult Airway: No    Indications for Airway Management:  Anesthesia      Spontaneous Ventilation: absent    Sedation Level:  Deep  Preoxygenated: Yes    Mask Difficulty Assessment:  0 - not attempted  Final Airway Type:  Supraglottic airway  Final Supraglottic Airway:  Standard LMA    SGA Size:  3  Number of Attempts at Approach:  1

## 2025-01-08 NOTE — OR NURSING
*This is a Running Note*    1354 Pt to PACU 1354 from OR. Bedside report from anesthesiologist and RN.  Attached to monitoring, VSS, breathing is calm and unlabored, Patient is asleep currently. Pt has a a kaci pad and CDI. Remains on 6 L oxygen via mask with an Oral airway in place.      1400 Oral Airway out, Pt denies pain or nausea at this time.     1408 Pt Now on RA, and has had some water, tolerating well. Glasses placed on patient.    1441 Criteria met to transition patient to phase 2 recovery.    1500 Pt is up to restroom to attempt void, and to get dressed, mother is now at bedside.    1519 Pt was able to void, abck to room,  still no pain.  Pt stable to discharge.  Instructions given, patient and  and daughter verbalize understanding, signed, and sent with patient.     1526 PIV removed with tip intact. Patient escorted out to responsible adult via wheelchair by RN and family, Taken to car. Belongings with patient, ambulated with steady gait.

## 2025-01-08 NOTE — ANESTHESIA PREPROCEDURE EVALUATION
Case: [id number] Date/Time: 01/08/25 1245    Procedures:       HYSTEROSCOPY WITH DILATATION AND CURETTAGE USING ROTATING CUTTER BLADE, POSSIBLE POLYPECTOMY, ALL INDICATED PROCEDURES      DILATION AND CURETTAGE    Pre-op diagnosis: ABNORMAL UTERINE BLEEDING    Location: [facility] ROOM 21 / SURGERY SAME DAY [facility]    Surgeons: Bimal Mcdonough M.D.          Past Medical History:   Diagnosis Date    Allergy     Anesthesia 2001    See above. Severe vomiting with shakes.    Anginal syndrome (HCC) 2020    Arrhythmia     birth    Arthritis 2024    Symptoms, no diagnosis, but symptoms after Covid was in the household, mostly in the shoulders, wrists, hands.    Awareness under anesthesia 2000s    Sometime in the 2000s, this happened once, maybe during tonsillectomy or D&C, or I overheard them saying something like hyper someting or hyper aware. This is okay with me, as long as I am not feeling pain    Dental disorder 2013 periodontitis    Deep cleans, ceased in 2017, now on 4 times a year maintenance plan cleanings    Gastroesophageal reflux disease 12/29/2014    Gynecological disorder 2024    Very heavy bleeding, thing inside uterus visible on ultrasound -  may be cyst or polyp    Hay fever 09/25/2013    Headache(784.0)     Heart burn 2005    Heart murmur 1980    It was reportedly gone in 1985, after the cardiologist allegedly said the hole in my heart healed at age [age].    High cholesterol 2023    LDL    Mixed anxiety depressive disorder 08/08/2019    Myocardial infarct ([facility]) Dec. 25th 2024    Symptoms, but urgent care doctor said it was likely a gallbladder attack , based on symptoms. Heart attack was not diagnosed. EKG was normal, but no heart attack blood test, and no echo was done    Pneumonia 1996    One bad case, once.    PONV (postoperative nausea and vomiting) 2001    shakes, vomiting, pretty severe    Pregnant Not currently pregnant    Rheumatic fever 1999    Scarlet fever    Substance abuse (HCC)     alcoholic; last  drank about a year ago.     Urinary incontinence 2020s, or after birth 2006    Just sneezing. Laser rejuvenation has gotten rid of this symptom.       Relevant Problems   GI   (positive) Gastroesophageal reflux disease       Physical Exam    Airway   Mallampati: II  TM distance: >3 FB  Neck ROM: full       Cardiovascular - normal exam  Rhythm: regular  Rate: normal  (-) murmur     Dental - normal exam           Pulmonary - normal exam  Breath sounds clear to auscultation     Abdominal    Neurological - normal exam                   Anesthesia Plan    ASA 2       Plan - general       Airway plan will be LMA          Induction: intravenous    Postoperative Plan: Postoperative administration of opioids is intended.    Pertinent diagnostic labs and testing reviewed    Informed Consent:    Anesthetic plan and risks discussed with patient.    Use of blood products discussed with: patient whom consented to blood products.

## 2025-01-08 NOTE — ANESTHESIA POSTPROCEDURE EVALUATION
Patient: Anny Tian    Procedure Summary       Date: 01/08/25 Room / Location: [facility] ROOM 21 / SURGERY SAME DAY [facility]    Anesthesia Start: 1326 Anesthesia Stop: 1356    Procedures:       HYSTEROSCOPY WITH DILATATION AND CURETTAGE USING ROTATING CUTTER BLADE, POSSIBLE POLYPECTOMY, ALL INDICATED PROCEDURES (Uterus)      DILATION AND CURETTAGE (Uterus) Diagnosis: (ABNORMAL UTERINE BLEEDING)    Surgeons: Bimal Mcdonough M.D. Responsible Provider: Yfn Mcallister M.D.    Anesthesia Type: general ASA Status: 2            Final Anesthesia Type: general  Last vitals  BP   Blood Pressure: 139/80    Temp   36.8 °C (98.2 °F)    Pulse   80   Resp   18    SpO2   96 %      Anesthesia Post Evaluation    Patient location during evaluation: PACU  Patient participation: complete - patient participated  Level of consciousness: awake    Airway patency: patent  Anesthetic complications: no  Cardiovascular status: hemodynamically stable  Respiratory status: acceptable  Hydration status: euvolemic    PONV: none          No notable events documented.     Nurse Pain Score: 5 (NPRS)

## 2025-01-08 NOTE — ANESTHESIA TIME REPORT
Anesthesia Start and Stop Event Times       Date Time Event    1/8/2025 1255 Ready for Procedure     1326 Anesthesia Start     1356 Anesthesia Stop          Responsible Staff  01/08/25      Name Role Begin End    Yfn Mcallister M.D. Anesth 1326 1356          Overtime Reason:  no overtime (within assigned shift)    Comments:

## 2025-01-08 NOTE — DISCHARGE INSTRUCTIONS
If any questions arise, call your provider.  If your provider is not available, please feel free to call the [facility] at [phone].    MEDICATIONS: Resume taking daily medication.  Take prescribed pain medication with food.  If no medication is prescribed, you may take non-aspirin pain medication if needed.  PAIN MEDICATION CAN BE VERY CONSTIPATING.  Take a stool softener or laxative such as senokot, pericolace, or milk of magnesia if needed.      What to Expect Post Anesthesia    Rest and take it easy for the first 24 hours.  A responsible adult is recommended to remain with you during that time.  It is normal to feel sleepy.  We encourage you to not do anything that requires balance, judgment or coordination.    FOR 24 HOURS DO NOT:  Drive, operate machinery or run household appliances.  Drink beer or alcoholic beverages.  Make important decisions or sign legal documents.    To avoid nausea, slowly advance diet as tolerated, avoiding spicy or greasy foods for the first day.  Add more substantial food to your diet according to your provider's instructions.  Babies can be fed formula or breast milk as soon as they are hungry.  INCREASE FLUIDS AND FIBER TO AVOID CONSTIPATION.    MILD FLU-LIKE SYMPTOMS ARE NORMAL.  YOU MAY EXPERIENCE GENERALIZED MUSCLE ACHES, THROAT IRRITATION, HEADACHE AND/OR SOME NAUSEA.      Hysteroscopy, Care After  This sheet gives you information about how to care for yourself after your procedure. Your health care provider may also give you more specific instructions. If you have problems or questions, contact your health care provider.  What can I expect after the procedure?  After the procedure, it is common to have:  Cramping.  Bleeding. This can vary from light spotting to menstrual-like bleeding.  Follow these instructions at home:  Activity  Rest for 1-2 days after the procedure.  Do not douche, use tampons, or have sex for 2 weeks after the procedure, or until your health  care provider approves.  Do not drive for 24 hours after the procedure, or for as long as told by your health care provider.  Do not drive, use heavy machinery, or drink alcohol while taking prescription pain medicines.  Medicines    Take over-the-counter and prescription medicines only as told by your health care provider.  Do not take aspirin during recovery. It can increase the risk of bleeding.  General instructions  Do not take baths, swim, or use a hot tub until your health care provider approves. Take showers instead of baths for 2 weeks, or for as long as told by your health care provider.  To prevent or treat constipation while you are taking prescription pain medicine, your health care provider may recommend that you:  Drink enough fluid to keep your urine clear or pale yellow.  Take over-the-counter or prescription medicines.  Eat foods that are high in fiber, such as fresh fruits and vegetables, whole grains, and beans.  Limit foods that are high in fat and processed sugars, such as fried and sweet foods.  Keep all follow-up visits as told by your health care provider. This is important.  Contact a health care provider if:  You feel dizzy or lightheaded.  You feel nauseous.  You have abnormal vaginal discharge.  You have a rash.  You have pain that does not get better with medicine.  You have chills.  Get help right away if:  You have bleeding that is heavier than a normal menstrual period.  You have a fever.  You have pain or cramps that get worse.  You develop new abdominal pain.  You faint.  You have pain in your shoulders.  You have shortness of breath.  Summary  After the procedure, you may have cramping and some vaginal bleeding.  Do not douche, use tampons, or have sex for 2 weeks after the procedure, or until your health care provider approves.  Do not take baths, swim, or use a hot tub until your health care provider approves. Take showers instead of baths for 2 weeks, or for as long as told by  your health care provider.  Report any unusual symptoms to your health care provider.  Keep all follow-up visits as told by your health care provider. This is important.  This information is not intended to replace advice given to you by your health care provider. Make sure you discuss any questions you have with your health care provider.  Document Released: 10/08/2014 Document Revised: 11/30/2018 Document Reviewed: 01/16/2018  Elsevier Patient Education © 2020 [employer]

## 2025-01-14 NOTE — OP REPORT
Description: Dilation and curettage (D&C) and hysteroscopy.   PREOPERATIVE DIAGNOSES:  1. Abnormal uterine bleeding.    POSTOPERATIVE DIAGNOSES:  1. Abnormal uterine bleeding.    PROCEDURE PERFORMED:  1. Dilation and curettage (D&C).  2. Hysteroscopy.    ANESTHESIA:  general anesthesia   SURGEON : Bimal Mcdonough. M.D    ESTIMATED BLOOD LOSS: Less than 10 cc.        PROCEDURE: The patient was consented and seen in the preoperative suite. She was taken to the operative suite, placed in a dorsal lithotomy position, and placed under anesthesia . She was prepped and draped in the normal sterile fashion. Her bladder was drained with the red Euceda catheter which produced approximately 100 cc of clear yellow urine. A bimanual exam was done, was performed by  . The uterus was found to be anteverted, mobile,. The cervix , the os and vagina were grossly normal with no+ obvious masses or deformities. A weighted speculum was placed in the posterior aspect of the vagina and the anterior lip of the cervix was grasped with the vulsellum tenaculum.    The uterus was sounded to 8 cm. The cervix was serially  dilated with  Elysia dilator to number 8,  the hysteroscope was passed through  the cervix under direct visualisation into the uterus. Under direct visualization, the ostia were within normal limits. The endometrial lining was hyperplastic, however, there was  no evidence of endometrial polyps.The hyperplastic tissue did not appear to have calcification or other abnormalities.  The hysteroscope was removed and a sharp curette was placed intrauterine very carefully using  anterior wall for guidance and the walls were scraped all around. The endometrial sampling was placed on Telfa pad and sent to Pathology for evaluation.  The uterine curette was removed as well as the vulsellum tenaculum and the weighted speculum. The cervix was found to be hemostatic. The patient was taken off the dorsal lithotomy position and  recovered from her IV sedation in the recovery room. The patient will be sent home once stable from anesthesia. She will be instructed to followup in the office in two weeks for discussion of the pathologic report of the endometrial curettings.   Sponge lap instrument count correct x 2.

## 2025-01-23 DIAGNOSIS — F90.0 ATTENTION DEFICIT HYPERACTIVITY DISORDER, INATTENTIVE TYPE: ICD-10-CM

## 2025-01-23 RX ORDER — DEXTROAMPHETAMINE SACCHARATE, AMPHETAMINE ASPARTATE MONOHYDRATE, DEXTROAMPHETAMINE SULFATE AND AMPHETAMINE SULFATE 2.5; 2.5; 2.5; 2.5 MG/1; MG/1; MG/1; MG/1
10 CAPSULE, EXTENDED RELEASE ORAL EVERY MORNING
Qty: 30 CAPSULE | Refills: 0 | Status: SHIPPED | OUTPATIENT
Start: 2025-01-23 | End: 2025-02-22

## 2025-02-10 ENCOUNTER — HOSPITAL ENCOUNTER (OUTPATIENT)
Dept: RADIOLOGY | Facility: MEDICAL CENTER | Age: 45
End: 2025-02-10
Attending: PHYSICIAN ASSISTANT
Payer: COMMERCIAL

## 2025-02-10 DIAGNOSIS — R10.11 RUQ PAIN: ICD-10-CM

## 2025-02-10 PROCEDURE — 76705 ECHO EXAM OF ABDOMEN: CPT

## 2025-02-18 DIAGNOSIS — F41.8 MIXED ANXIETY DEPRESSIVE DISORDER: ICD-10-CM

## 2025-02-18 RX ORDER — OXCARBAZEPINE 150 MG/1
150-300 TABLET, FILM COATED ORAL NIGHTLY
Qty: 180 TABLET | Refills: 1 | Status: SHIPPED | OUTPATIENT
Start: 2025-02-18

## 2025-02-27 ENCOUNTER — PATIENT MESSAGE (OUTPATIENT)
Dept: BEHAVIORAL HEALTH | Facility: MEDICAL CENTER | Age: 45
End: 2025-02-27
Payer: COMMERCIAL

## 2025-02-27 ENCOUNTER — HOSPITAL ENCOUNTER (OUTPATIENT)
Dept: BEHAVIORAL HEALTH | Facility: MEDICAL CENTER | Age: 45
End: 2025-02-27
Attending: FAMILY MEDICINE
Payer: COMMERCIAL

## 2025-02-27 DIAGNOSIS — F90.0 ATTENTION DEFICIT HYPERACTIVITY DISORDER, INATTENTIVE TYPE: ICD-10-CM

## 2025-02-27 RX ORDER — DEXTROAMPHETAMINE SACCHARATE, AMPHETAMINE ASPARTATE MONOHYDRATE, DEXTROAMPHETAMINE SULFATE AND AMPHETAMINE SULFATE 2.5; 2.5; 2.5; 2.5 MG/1; MG/1; MG/1; MG/1
10 CAPSULE, EXTENDED RELEASE ORAL EVERY MORNING
Qty: 30 CAPSULE | Refills: 0 | Status: SHIPPED | OUTPATIENT
Start: 2025-02-27 | End: 2025-03-29

## 2025-02-28 ENCOUNTER — PATIENT MESSAGE (OUTPATIENT)
Dept: BEHAVIORAL HEALTH | Facility: CLINIC | Age: 45
End: 2025-02-28
Payer: COMMERCIAL

## 2025-02-28 NOTE — PROGRESS NOTES
Virtual Visit: Established Patient   This visit was conducted via Teams using secure and encrypted videoconferencing technology.   The patient was in their home in the [facility].    The patient's identity was confirmed and verbal consent was obtained for this virtual visit.     Subjective:   CC: No chief complaint on file.      Anny Tian is a 44 y.o. female presenting for evaluation and management of:    No chief complaint on file.      HISTORY OF PRESENT ILLNESS: Patient is a 44 y.o. female established patient who presents today for a med refill of a controlled substance in addition to their other issues listed in the rest of the progress note    The patient is requesting medication for the following issue:adhd  Approximate date of onset of condition was years ago.    Current Problems:  ADHD      Patient Active Problem List    Diagnosis Date Noted    Hot flashes due to menopause 05/12/2020    Atypical chest pain 08/08/2019    Mixed anxiety depressive disorder 08/08/2019    Body mass index (BMI) of 21.0 to 21.9 in adult 07/19/2019    Gastritis due to Helicobacter species 08/20/2018    Tinea versicolor 06/27/2017    Gastroesophageal reflux disease 12/29/2014    Other benign neoplasm of skin, unspecified 10/06/2014    Palpitations 10/06/2014    Well adult health check 10/06/2014    Tachycardia 09/25/2013    Hay fever 09/25/2013    Hx of drug abuse ([facility]) 09/25/2013    Dysuria 09/25/2013    Lower abdominal pain 09/25/2013    History of alcoholism ([facility]) 04/19/2013       Allergies:Gluten meal, Hydrocodone-acetaminophen, Benadryl allergy, and Vicodin [hydrocodone-acetaminophen]    Current Outpatient Medications   Medication Sig Dispense Refill    amphetamine-dextroamphetamine XR (ADDERALL XR, 10MG,) 10 MG CAPSULE SR 24 HR Take 1 Capsule by mouth every morning for 30 days. 30 Capsule 0    OXcarbazepine (TRILEPTAL) 150 MG Tab Take 1-2 Tablets by mouth every evening. 180 Tablet 1    ibuprofen (MOTRIN) 600 MG Tab  Take 1 Tablet by mouth every 6 hours as needed for Moderate Pain for up to 30 doses. 30 Tablet 0    ibuprofen (MOTRIN) 600 MG Tab Take 1 Tablet by mouth every 6 hours as needed for Moderate Pain or Mild Pain for up to 30 doses. 30 Tablet 0    B Complex Cap Take  by mouth every day.      Multiple Vitamins-Minerals (MULTIVITAMIN WOMEN PO) Take  by mouth every day. V-thrive bio active women's multi high potency.      MAGNESIUM PO Take  by mouth every day. Magnesium L Theonate - takes this or other magnesium.      thiamine (THIAMINE) 100 MG tablet TAKE ONE TABLET BY MOUTH ONE TIME DAILY 30 Tablet 3    L-Theanine 100 MG Cap Take  by mouth as needed.      Calcium Citrate 150 MG Cap Take  by mouth every 7 days.      ASHWAGANDHA PO Take  by mouth every 72 hours.      Zinc 50 MG Tab Take 50 mg by mouth every 48 hours.      Magnesium 400 MG Tab Take 200-400 mg by mouth every evening. Takes this or Magnesium L Threonate.      Omega-3 Fatty Acids (FISH OIL) 1000 MG Cap capsule Take 1,000 mg by mouth every day.       No current facility-administered medications for this encounter.         I have discussed with the patient that with any controlled substance prescription it is vital to have these medications in a safe, secure environment. I have discussed that it is their responsibility that their medications are not accessible to anyone else who may use them inadvertently.    I have discussed with the patient that any controlled substance can impair the ability to drive or operate any machinery and that the patient should not use them if they plan on driving or operating machinery.    I have reviewed and updated the past medical/surgical, family history and social history as of today.    ROS:  Review of Systems   Constitutional: Negative for fever, chills, weight loss and malaise/fatigue.   Respiratory: Negative for cough, sputum production, shortness of breath and wheezing.    Cardiovascular: Negative for chest pain,  palpitations, orthopnea and leg swelling.   Gastrointestinal: Negative for heartburn, nausea, vomiting, constipation and abdominal pain.  Musculoskeletal:  neg  Skin: Negative for rash and itching.   Neurological: neg  Psychiatric/Behavioral: Negative for signs or symptoms of depression,, suicidal or homicidal ideation.  Patient able to contract for their safety.  + anxiety  All other systems reviewed and are negative except as in HPI.      Exam:  There were no vitals taken for this visit.  General:  female patient who appears in no distress        DISCUSSION OF CARE PLAN:    - I discussed management options. I reviewed symptomatic care     - I will obtain/review additional records if needed    - I discussed efforts with home exercise program and activity modification     - I reviewed medication monitoring.       The patient was advised to avoid alcohol use with prescribed medication. I counseled the patient regarding risks, side effects, and interactions of medications. I counseled the patient on the controlled substance prescribing program. I reviewed further symptomatic medications.  - I reviewed additional diagnostic options: Yes.  - I reviewed additional therapeutic options: Yes  - I reviewed psychosocial interventions:  Yes      Assessment/Plan:  1. Attention deficit hyperactivity disorder, inattentive type  amphetamine-dextroamphetamine XR (ADDERALL XR, 10MG,) 10 MG CAPSULE SR 24 HR          A Controlled Substance Agreement has been signed within the last year. A urine drug screen has been done in the past year.      The patient reports that their insomnia is well controlled with the current treatment plan  They were counseled on other means to help with sleep   This patient is continuing to use a controlled substance (CS) on a long term basis.  The patient is thoroughly aware of the goals of treatment with the CS  The patient is aware that yearly and random urine drug screens are required.  The patient has  been instructed to take the CS only as prescribed.  The patient is prohibited from sharing the CS with any other person.  The patient is instructed to inform the provider if any other CS is taken, of any alcohol or cannabis or other recreational drug use, any treatment for side effects of the CS or complications, if they have CS active rx in other states  The patient has evidence for a reason for the CS  The treatment plan has been discussed with the patient  The  report has been reviewed            ROS       Current medicines (including changes today)  Current Outpatient Medications   Medication Sig Dispense Refill    amphetamine-dextroamphetamine XR (ADDERALL XR, 10MG,) 10 MG CAPSULE SR 24 HR Take 1 Capsule by mouth every morning for 30 days. 30 Capsule 0    OXcarbazepine (TRILEPTAL) 150 MG Tab Take 1-2 Tablets by mouth every evening. 180 Tablet 1    ibuprofen (MOTRIN) 600 MG Tab Take 1 Tablet by mouth every 6 hours as needed for Moderate Pain for up to 30 doses. 30 Tablet 0    ibuprofen (MOTRIN) 600 MG Tab Take 1 Tablet by mouth every 6 hours as needed for Moderate Pain or Mild Pain for up to 30 doses. 30 Tablet 0    B Complex Cap Take  by mouth every day.      Multiple Vitamins-Minerals (MULTIVITAMIN WOMEN PO) Take  by mouth every day. V-thrive bio active women's multi high potency.      MAGNESIUM PO Take  by mouth every day. Magnesium L Theonate - takes this or other magnesium.      thiamine (THIAMINE) 100 MG tablet TAKE ONE TABLET BY MOUTH ONE TIME DAILY 30 Tablet 3    L-Theanine 100 MG Cap Take  by mouth as needed.      Calcium Citrate 150 MG Cap Take  by mouth every 7 days.      ASHWAGANDHA PO Take  by mouth every 72 hours.      Zinc 50 MG Tab Take 50 mg by mouth every 48 hours.      Magnesium 400 MG Tab Take 200-400 mg by mouth every evening. Takes this or Magnesium L Threonate.      Omega-3 Fatty Acids (FISH OIL) 1000 MG Cap capsule Take 1,000 mg by mouth every day.       No current  facility-administered medications for this encounter.       Patient Active Problem List    Diagnosis Date Noted    Hot flashes due to menopause 05/12/2020    Atypical chest pain 08/08/2019    Mixed anxiety depressive disorder 08/08/2019    Body mass index (BMI) of 21.0 to 21.9 in adult 07/19/2019    Gastritis due to Helicobacter species 08/20/2018    Tinea versicolor 06/27/2017    Gastroesophageal reflux disease 12/29/2014    Other benign neoplasm of skin, unspecified 10/06/2014    Palpitations 10/06/2014    Well adult health check 10/06/2014    Tachycardia 09/25/2013    Hay fever 09/25/2013    Hx of drug abuse ([facility]) 09/25/2013    Dysuria 09/25/2013    Lower abdominal pain 09/25/2013    History of alcoholism ([facility]) 04/19/2013        Objective:   There were no vitals taken for this visit.    Physical Exam:  Constitutional: Alert, no distress, well-groomed.  Skin: No rashes in visible areas.  Eye: Round. Conjunctiva clear, lids normal. No icterus.   ENMT: Lips pink without lesions, good dentition, moist mucous membranes. Phonation normal.  Neck: No masses, no thyromegaly. Moves freely without pain.  Respiratory: Unlabored respiratory effort, no cough or audible wheeze  Psych: Alert and oriented x3, normal affect and mood.     Assessment and Plan:   The following treatment plan was discussed:     1. Attention deficit hyperactivity disorder, inattentive type  - amphetamine-dextroamphetamine XR (ADDERALL XR, 10MG,) 10 MG CAPSULE SR 24 HR; Take 1 Capsule by mouth every morning for 30 days.  Dispense: 30 Capsule; Refill: 0      Follow-up: No follow-ups on file.

## 2025-03-11 DIAGNOSIS — F90.0 ATTENTION DEFICIT HYPERACTIVITY DISORDER, INATTENTIVE TYPE: ICD-10-CM

## 2025-03-11 NOTE — TELEPHONE ENCOUNTER
Received request via: Patient    Was the patient seen in the last year in this department? Yes    Does the patient have an active prescription (recently filled or refills available) for medication(s) requested? No    Pharmacy Name: [facility] [address]    Does the patient have [facility] and need 100-day supply? (This applies to ALL medications) Patient does not have SCP

## 2025-03-12 RX ORDER — DEXTROAMPHETAMINE SACCHARATE, AMPHETAMINE ASPARTATE, DEXTROAMPHETAMINE SULFATE AND AMPHETAMINE SULFATE 2.5; 2.5; 2.5; 2.5 MG/1; MG/1; MG/1; MG/1
10 TABLET ORAL EVERY MORNING
Qty: 30 TABLET | Refills: 0 | OUTPATIENT
Start: 2025-03-12 | End: 2025-04-11

## 2025-03-17 DIAGNOSIS — F90.0 ATTENTION DEFICIT HYPERACTIVITY DISORDER, INATTENTIVE TYPE: ICD-10-CM

## 2025-03-17 RX ORDER — DEXTROAMPHETAMINE SACCHARATE, AMPHETAMINE ASPARTATE, DEXTROAMPHETAMINE SULFATE AND AMPHETAMINE SULFATE 2.5; 2.5; 2.5; 2.5 MG/1; MG/1; MG/1; MG/1
10 TABLET ORAL EVERY MORNING
Qty: 30 TABLET | Refills: 0 | Status: SHIPPED | OUTPATIENT
Start: 2025-03-17 | End: 2025-04-16

## 2025-03-17 NOTE — TELEPHONE ENCOUNTER
Received request via: Patient    Was the patient seen in the last year in this department? Yes    Does the patient have an active prescription (recently filled or refills available) for medication(s) requested? No    Pharmacy Name: [facility]     Does the patient have [facility] and need 100-day supply? (This applies to ALL medications) Patient does not have SCP

## 2025-04-03 DIAGNOSIS — F90.0 ATTENTION DEFICIT HYPERACTIVITY DISORDER, INATTENTIVE TYPE: ICD-10-CM

## 2025-04-03 RX ORDER — DEXTROAMPHETAMINE SACCHARATE, AMPHETAMINE ASPARTATE, DEXTROAMPHETAMINE SULFATE AND AMPHETAMINE SULFATE 2.5; 2.5; 2.5; 2.5 MG/1; MG/1; MG/1; MG/1
10 TABLET ORAL EVERY MORNING
Qty: 5 TABLET | Refills: 0 | Status: SHIPPED | OUTPATIENT
Start: 2025-04-03 | End: 2025-04-08

## 2025-04-03 NOTE — TELEPHONE ENCOUNTER
PT REQUESTING TRIAL OF BRAND NAME MEDICATION. FILL FOR 5 DAYS.     Received request via: Patient    Was the patient seen in the last year in this department? Yes    Does the patient have an active prescription (recently filled or refills available) for medication(s) requested? No    Pharmacy Name: [facility]     Does the patient have [facility] and need 100-day supply? (This applies to ALL medications) Patient does not have SCP

## 2025-04-16 ENCOUNTER — OFFICE VISIT (OUTPATIENT)
Dept: URGENT CARE | Facility: CLINIC | Age: 45
End: 2025-04-16
Payer: COMMERCIAL

## 2025-04-16 VITALS
OXYGEN SATURATION: 99 % | WEIGHT: 144 LBS | TEMPERATURE: 98.6 F | BODY MASS INDEX: 21.82 KG/M2 | HEIGHT: 68 IN | SYSTOLIC BLOOD PRESSURE: 112 MMHG | RESPIRATION RATE: 16 BRPM | HEART RATE: 66 BPM | DIASTOLIC BLOOD PRESSURE: 60 MMHG

## 2025-04-16 DIAGNOSIS — J02.9 SORE THROAT: ICD-10-CM

## 2025-04-16 LAB — S PYO DNA SPEC NAA+PROBE: NOT DETECTED

## 2025-04-16 PROCEDURE — 87651 STREP A DNA AMP PROBE: CPT | Performed by: FAMILY MEDICINE

## 2025-04-16 PROCEDURE — 99213 OFFICE O/P EST LOW 20 MIN: CPT | Performed by: FAMILY MEDICINE

## 2025-04-16 PROCEDURE — 3078F DIAST BP <80 MM HG: CPT | Performed by: FAMILY MEDICINE

## 2025-04-16 PROCEDURE — 3074F SYST BP LT 130 MM HG: CPT | Performed by: FAMILY MEDICINE

## 2025-04-16 RX ORDER — LIDOCAINE HYDROCHLORIDE 20 MG/ML
5 SOLUTION OROPHARYNGEAL EVERY 4 HOURS PRN
Qty: 100 ML | Refills: 0 | Status: SHIPPED | OUTPATIENT
Start: 2025-04-16

## 2025-04-16 ASSESSMENT — ENCOUNTER SYMPTOMS
CONSTITUTIONAL NEGATIVE: 1
MUSCULOSKELETAL NEGATIVE: 1
RESPIRATORY NEGATIVE: 1
EYES NEGATIVE: 1
SORE THROAT: 1
CARDIOVASCULAR NEGATIVE: 1
GASTROINTESTINAL NEGATIVE: 1

## 2025-04-16 NOTE — PROGRESS NOTES
"Subjective:   Anny Tian is a 44 y.o. female who presents for Sore Throat (X 4 days, severe swollen throat, weak, hard to swallow.)      HPI    Review of Systems   Constitutional: Negative.    HENT:  Positive for sore throat.    Eyes: Negative.    Respiratory: Negative.     Cardiovascular: Negative.    Gastrointestinal: Negative.    Genitourinary: Negative.    Musculoskeletal: Negative.    Skin: Negative.        Medications, Allergies, and current problem list reviewed today in Epic.     Objective:     /60   Pulse 66   Temp 37 °C (98.6 °F) (Temporal)   Resp 16   Ht 1.727 m (5' 8\")   Wt 65.3 kg (144 lb)   SpO2 99%     Physical Exam  Vitals and nursing note reviewed.   Constitutional:       Appearance: Normal appearance.   HENT:      Right Ear: Tympanic membrane normal.      Left Ear: Tympanic membrane normal.      Nose: Nose normal.      Mouth/Throat:      Comments: Post pharynx ulcers worse on the left side, no lymph node swelling  Cardiovascular:      Rate and Rhythm: Normal rate and regular rhythm.      Pulses: Normal pulses.      Heart sounds: Normal heart sounds.   Pulmonary:      Effort: Pulmonary effort is normal.      Breath sounds: Normal breath sounds.   Musculoskeletal:      Cervical back: Normal range of motion and neck supple. Tenderness present.   Lymphadenopathy:      Cervical: No cervical adenopathy.   Neurological:      Mental Status: She is alert.         Assessment/Plan:     Diagnosis and associated orders:     1. Sore throat  lidocaine (XYLOCAINE) 2 % Solution    POCT CEPHEID GROUP A STREP - PCR         Comments/MDM:              Differential diagnosis, natural history, supportive care, and indications for immediate follow-up discussed.    Advised the patient to follow-up with the primary care physician for recheck, reevaluation, and consideration of further management.    Please note that this dictation was created using voice recognition software. I have made a reasonable " attempt to correct obvious errors, but I expect that there are errors of grammar and possibly content that I did not discover before finalizing the note.    This note was electronically signed by Johnie Dixon M.D.

## 2025-05-29 ENCOUNTER — APPOINTMENT (OUTPATIENT)
Dept: BEHAVIORAL HEALTH | Facility: MEDICAL CENTER | Age: 45
End: 2025-05-29
Attending: FAMILY MEDICINE
Payer: COMMERCIAL

## 2025-06-26 DIAGNOSIS — F90.0 ATTENTION DEFICIT HYPERACTIVITY DISORDER, INATTENTIVE TYPE: ICD-10-CM

## 2025-06-26 NOTE — TELEPHONE ENCOUNTER
Received request via: Patient    Was the patient seen in the last year in this department? Yes    Does the patient have an active prescription (recently filled or refills available) for medication(s) requested? No    Pharmacy Name: [facility] [address]     Does the patient have [facility] and need 100-day supply? (This applies to ALL medications) Patient does not have SCP

## 2025-06-27 RX ORDER — DEXTROAMPHETAMINE SACCHARATE, AMPHETAMINE ASPARTATE MONOHYDRATE, DEXTROAMPHETAMINE SULFATE AND AMPHETAMINE SULFATE 2.5; 2.5; 2.5; 2.5 MG/1; MG/1; MG/1; MG/1
10 CAPSULE, EXTENDED RELEASE ORAL EVERY MORNING
Qty: 30 CAPSULE | Refills: 0 | Status: SHIPPED | OUTPATIENT
Start: 2025-06-27 | End: 2025-07-27

## 2025-07-22 ENCOUNTER — HOSPITAL ENCOUNTER (OUTPATIENT)
Dept: BEHAVIORAL HEALTH | Facility: MEDICAL CENTER | Age: 45
End: 2025-07-22
Attending: FAMILY MEDICINE
Payer: COMMERCIAL

## 2025-07-22 DIAGNOSIS — F90.0 ATTENTION DEFICIT HYPERACTIVITY DISORDER, INATTENTIVE TYPE: Primary | ICD-10-CM

## 2025-07-22 DIAGNOSIS — Z79.899 HIGH RISK MEDICATION USE: ICD-10-CM

## 2025-07-22 PROCEDURE — 99214 OFFICE O/P EST MOD 30 MIN: CPT | Mod: 95 | Performed by: FAMILY MEDICINE

## 2025-07-22 NOTE — PROGRESS NOTES
Virtual Visit: Established Patient   This visit was conducted via Teams using secure and encrypted videoconferencing technology.   The patient was in their home in the [facility].    The patient's identity was confirmed and verbal consent was obtained for this virtual visit.     Subjective:   CC: No chief complaint on file.      Anny Tian is a 45 y.o. female presenting for evaluation and management of:    No chief complaint on file.      HISTORY OF PRESENT ILLNESS: Patient is a 45 y.o. female established patient who presents today for a med refill of a controlled substance in addition to their other issues listed in the rest of the progress note    The patient is requesting medication for the following issue:adhd  Approximate date of onset of condition was years ago.    Current Problems:  ADHD      Patient Active Problem List    Diagnosis Date Noted    Hot flashes due to menopause 05/12/2020    Atypical chest pain 08/08/2019    Mixed anxiety depressive disorder 08/08/2019    Body mass index (BMI) of 21.0 to 21.9 in adult 07/19/2019    Gastritis due to Helicobacter species 08/20/2018    Tinea versicolor 06/27/2017    Gastroesophageal reflux disease 12/29/2014    Other benign neoplasm of skin, unspecified 10/06/2014    Palpitations 10/06/2014    Well adult health check 10/06/2014    Tachycardia 09/25/2013    Hay fever 09/25/2013    Hx of drug abuse (HCC) 09/25/2013    Dysuria 09/25/2013    Lower abdominal pain 09/25/2013    History of alcoholism ([facility]) 04/19/2013       Allergies:Gluten meal, Hydrocodone-acetaminophen, Benadryl allergy, and Vicodin [hydrocodone-acetaminophen]    Current Medications[1]      I have discussed with the patient that with any controlled substance prescription it is vital to have these medications in a safe, secure environment. I have discussed that it is their responsibility that their medications are not accessible to anyone else who may use them inadvertently.    I have  discussed with the patient that any controlled substance can impair the ability to drive or operate any machinery and that the patient should not use them if they plan on driving or operating machinery.    I have reviewed and updated the past medical/surgical, family history and social history as of today.    ROS:  Review of Systems   Constitutional: Negative for fever, chills, weight loss and malaise/fatigue.   Respiratory: Negative for cough, sputum production, shortness of breath and wheezing.    Cardiovascular: Negative for chest pain, palpitations, orthopnea and leg swelling.   Gastrointestinal: Negative for heartburn, nausea, vomiting, constipation and abdominal pain.  Musculoskeletal:  neg  Skin: Negative for rash and itching.   Neurological: neg  Psychiatric/Behavioral: Negative for signs or symptoms of depression,, suicidal or homicidal ideation.  Patient able to contract for their safety.  + anxiety  All other systems reviewed and are negative except as in HPI.      Exam:  There were no vitals taken for this visit.  General:  female patient who appears in no distress        DISCUSSION OF CARE PLAN:    - I discussed management options. I reviewed symptomatic care     - I will obtain/review additional records if needed    - I discussed efforts with home exercise program and activity modification     - I reviewed medication monitoring.       The patient was advised to avoid alcohol use with prescribed medication. I counseled the patient regarding risks, side effects, and interactions of medications. I counseled the patient on the controlled substance prescribing program. I reviewed further symptomatic medications.  - I reviewed additional diagnostic options: Yes.  - I reviewed additional therapeutic options: Yes  - I reviewed psychosocial interventions:  Yes      Assessment/Plan:  1. Attention deficit hyperactivity disorder, inattentive type        2. High risk medication use            A Controlled Substance  Agreement has been signed within the last year. A urine drug screen has been done in the past year.      The patient reports that their insomnia is well controlled with the current treatment plan  They were counseled on other means to help with sleep   This patient is continuing to use a controlled substance (CS) on a long term basis.  The patient is thoroughly aware of the goals of treatment with the CS  The patient is aware that yearly and random urine drug screens are required.  The patient has been instructed to take the CS only as prescribed.  The patient is prohibited from sharing the CS with any other person.  The patient is instructed to inform the provider if any other CS is taken, of any alcohol or cannabis or other recreational drug use, any treatment for side effects of the CS or complications, if they have CS active rx in other states  The patient has evidence for a reason for the CS  The treatment plan has been discussed with the patient  The  report has been reviewed            ROS       Current medicines (including changes today)  Current Medications[2]    Patient Active Problem List    Diagnosis Date Noted    Hot flashes due to menopause 05/12/2020    Atypical chest pain 08/08/2019    Mixed anxiety depressive disorder 08/08/2019    Body mass index (BMI) of 21.0 to 21.9 in adult 07/19/2019    Gastritis due to Helicobacter species 08/20/2018    Tinea versicolor 06/27/2017    Gastroesophageal reflux disease 12/29/2014    Other benign neoplasm of skin, unspecified 10/06/2014    Palpitations 10/06/2014    Well adult health check 10/06/2014    Tachycardia 09/25/2013    Hay fever 09/25/2013    Hx of drug abuse (HCC) 09/25/2013    Dysuria 09/25/2013    Lower abdominal pain 09/25/2013    History of alcoholism (HCC) 04/19/2013        Objective:   There were no vitals taken for this visit.    Physical Exam:  Constitutional: Alert, no distress, well-groomed.  Skin: No rashes in visible areas.  Eye: Round.  Conjunctiva clear, lids normal. No icterus.   ENMT: Lips pink without lesions, good dentition, moist mucous membranes. Phonation normal.  Neck: No masses, no thyromegaly. Moves freely without pain.  Respiratory: Unlabored respiratory effort, no cough or audible wheeze  Psych: Alert and oriented x3, normal affect and mood.     Assessment and Plan:   The following treatment plan was discussed:     1. Attention deficit hyperactivity disorder, inattentive type    2. High risk medication use      Follow-up: No follow-ups on file.               [1]   Current Outpatient Medications   Medication Sig Dispense Refill    amphetamine-dextroamphetamine XR (ADDERALL XR, 10MG,) 10 MG CAPSULE SR 24 HR Take 1 Capsule by mouth every morning for 30 days. 30 Capsule 0    lidocaine (XYLOCAINE) 2 % Solution Take 5 mL by mouth every four hours as needed for Throat/Mouth Pain. 100 mL 0    OXcarbazepine (TRILEPTAL) 150 MG Tab Take 1-2 Tablets by mouth every evening. 180 Tablet 1    ibuprofen (MOTRIN) 600 MG Tab Take 1 Tablet by mouth every 6 hours as needed for Moderate Pain for up to 30 doses. 30 Tablet 0    ibuprofen (MOTRIN) 600 MG Tab Take 1 Tablet by mouth every 6 hours as needed for Moderate Pain or Mild Pain for up to 30 doses. 30 Tablet 0    B Complex Cap Take  by mouth every day.      Multiple Vitamins-Minerals (MULTIVITAMIN WOMEN PO) Take  by mouth every day. V-thrive bio active women's multi high potency.      MAGNESIUM PO Take  by mouth every day. Magnesium L Theonate - takes this or other magnesium.      thiamine (THIAMINE) 100 MG tablet TAKE ONE TABLET BY MOUTH ONE TIME DAILY 30 Tablet 3    L-Theanine 100 MG Cap Take  by mouth as needed. (Patient not taking: Reported on 4/16/2025)      Calcium Citrate 150 MG Cap Take  by mouth every 7 days. (Patient not taking: Reported on 4/16/2025)      ASHWAGANDHA PO Take  by mouth every 72 hours. (Patient not taking: Reported on 4/16/2025)      Zinc 50 MG Tab Take 50 mg by mouth every 48  hours.      Magnesium 400 MG Tab Take 200-400 mg by mouth every evening. Takes this or Magnesium L Threonate.      Omega-3 Fatty Acids (FISH OIL) 1000 MG Cap capsule Take 1,000 mg by mouth every day.       No current facility-administered medications for this encounter.   [2]   Current Outpatient Medications   Medication Sig Dispense Refill    amphetamine-dextroamphetamine XR (ADDERALL XR, 10MG,) 10 MG CAPSULE SR 24 HR Take 1 Capsule by mouth every morning for 30 days. 30 Capsule 0    lidocaine (XYLOCAINE) 2 % Solution Take 5 mL by mouth every four hours as needed for Throat/Mouth Pain. 100 mL 0    OXcarbazepine (TRILEPTAL) 150 MG Tab Take 1-2 Tablets by mouth every evening. 180 Tablet 1    ibuprofen (MOTRIN) 600 MG Tab Take 1 Tablet by mouth every 6 hours as needed for Moderate Pain for up to 30 doses. 30 Tablet 0    ibuprofen (MOTRIN) 600 MG Tab Take 1 Tablet by mouth every 6 hours as needed for Moderate Pain or Mild Pain for up to 30 doses. 30 Tablet 0    B Complex Cap Take  by mouth every day.      Multiple Vitamins-Minerals (MULTIVITAMIN WOMEN PO) Take  by mouth every day. V-thrive bio active women's multi high potency.      MAGNESIUM PO Take  by mouth every day. Magnesium L Theonate - takes this or other magnesium.      thiamine (THIAMINE) 100 MG tablet TAKE ONE TABLET BY MOUTH ONE TIME DAILY 30 Tablet 3    L-Theanine 100 MG Cap Take  by mouth as needed. (Patient not taking: Reported on 4/16/2025)      Calcium Citrate 150 MG Cap Take  by mouth every 7 days. (Patient not taking: Reported on 4/16/2025)      ASHWAGANDHA PO Take  by mouth every 72 hours. (Patient not taking: Reported on 4/16/2025)      Zinc 50 MG Tab Take 50 mg by mouth every 48 hours.      Magnesium 400 MG Tab Take 200-400 mg by mouth every evening. Takes this or Magnesium L Threonate.      Omega-3 Fatty Acids (FISH OIL) 1000 MG Cap capsule Take 1,000 mg by mouth every day.       No current facility-administered medications for this encounter.

## 2025-07-29 DIAGNOSIS — F90.0 ATTENTION DEFICIT HYPERACTIVITY DISORDER, INATTENTIVE TYPE: ICD-10-CM

## 2025-07-29 RX ORDER — DEXTROAMPHETAMINE SACCHARATE, AMPHETAMINE ASPARTATE MONOHYDRATE, DEXTROAMPHETAMINE SULFATE AND AMPHETAMINE SULFATE 2.5; 2.5; 2.5; 2.5 MG/1; MG/1; MG/1; MG/1
10 CAPSULE, EXTENDED RELEASE ORAL EVERY MORNING
Qty: 30 CAPSULE | Refills: 0 | Status: SHIPPED | OUTPATIENT
Start: 2025-09-27 | End: 2025-10-27

## 2025-07-29 RX ORDER — DEXTROAMPHETAMINE SACCHARATE, AMPHETAMINE ASPARTATE MONOHYDRATE, DEXTROAMPHETAMINE SULFATE AND AMPHETAMINE SULFATE 2.5; 2.5; 2.5; 2.5 MG/1; MG/1; MG/1; MG/1
10 CAPSULE, EXTENDED RELEASE ORAL EVERY MORNING
Qty: 30 CAPSULE | Refills: 0 | Status: SHIPPED | OUTPATIENT
Start: 2025-07-29 | End: 2025-08-28

## 2025-07-29 RX ORDER — DEXTROAMPHETAMINE SACCHARATE, AMPHETAMINE ASPARTATE MONOHYDRATE, DEXTROAMPHETAMINE SULFATE AND AMPHETAMINE SULFATE 2.5; 2.5; 2.5; 2.5 MG/1; MG/1; MG/1; MG/1
10 CAPSULE, EXTENDED RELEASE ORAL EVERY MORNING
Qty: 30 CAPSULE | Refills: 0 | Status: SHIPPED | OUTPATIENT
Start: 2025-08-28 | End: 2025-09-27

## 2025-07-29 NOTE — TELEPHONE ENCOUNTER
Received request via: Patient    Was the patient seen in the last year in this department? Yes    Does the patient have an active prescription (recently filled or refills available) for medication(s) requested? No    Pharmacy Name: [facility]     Does the patient have group home Plus and need 100-day supply? (This applies to ALL medications) Patient does not have SCP

## 2025-07-29 NOTE — TELEPHONE ENCOUNTER
PT REQUESTING ADDITIONAL FUTURE FILLS FOR 3 MONTHS     Received request via: Patient    Was the patient seen in the last year in this department? Yes    Does the patient have an active prescription (recently filled or refills available) for medication(s) requested? No    Pharmacy Name: [facility]     Does the patient have assisted Plus and need 100-day supply? (This applies to ALL medications) Patient does not have SCP

## 2025-07-29 NOTE — TELEPHONE ENCOUNTER
PT REQUESTING ADDITIONAL FUTURE FILLS FOR 3 MONTHS     Received request via: Patient    Was the patient seen in the last year in this department? Yes    Does the patient have an active prescription (recently filled or refills available) for medication(s) requested? No    Pharmacy Name: [facility]     Does the patient have [facility] and need 100-day supply? (This applies to ALL medications) Patient does not have SCP

## (undated) DEVICE — MASK OXYGEN VNYL ADLT MED CONC WITH 7 FOOT TUBING - (50EA/CA)

## (undated) DEVICE — Device

## (undated) DEVICE — LACTATED RINGERS INJ 1000 ML - (14EA/CA 60CA/PF)

## (undated) DEVICE — SET TUBING AQUILEX IN-FLOW MYOSURE (10EA/BX)

## (undated) DEVICE — SODIUM CHL IRRIGATION 0.9% 1000ML (12EA/CA)

## (undated) DEVICE — ELECTRODE DUAL RETURN W/ CORD - (50/PK)

## (undated) DEVICE — PAD SANITARY 11IN MAXI IND WRAPPED (12EA/PK 24PK/CA)

## (undated) DEVICE — GOWN WARMING STANDARD FLEX - (30/CA)

## (undated) DEVICE — CANNULA O2 COMFORT SOFT EAR ADULT 7 FT TUBING (50/CA)

## (undated) DEVICE — SET TUBING AQUILEX OUT-FLOW MYOSURE (10EA/BX)

## (undated) DEVICE — SLEEVE VASO DVT COMPRESSION CALF MED - (10PR/CA)

## (undated) DEVICE — GLOVE BIOGEL INDICATOR SZ 7SURGICAL PF LTX - (50/BX 4BX/CA)

## (undated) DEVICE — GLOVE SZ 7 BIOGEL PI MICRO - PF LF (50PR/BX 4BX/CA)

## (undated) DEVICE — TUBING CLEARLINK DUO-VENT - C-FLO (48EA/CA)

## (undated) DEVICE — CANISTER SUCTION 3000ML MECHANICAL FILTER AUTO SHUTOFF MEDI-VAC NONSTERILE LF DISP (40EA/CA)

## (undated) DEVICE — SENSOR OXIMETER ADULT SPO2 RD SET (20EA/BX)

## (undated) DEVICE — KIT  I.V. START (100EA/CA)

## (undated) DEVICE — TOWEL STOP TIMEOUT SAFETY FLAG (40EA/CA)

## (undated) DEVICE — SUCTION INSTRUMENT YANKAUER BULBOUS TIP W/O VENT (50EA/CA)

## (undated) DEVICE — SET LEADWIRE 5 LEAD BEDSIDE DISPOSABLE ECG (1SET OF 5/EA)

## (undated) DEVICE — CANISTER SUCTION RIGID RED 1500CC (40EA/CA)

## (undated) DEVICE — TUBE CONNECTING SUCTION - CLEAR PLASTIC STERILE 72 IN (50EA/CA)